# Patient Record
Sex: FEMALE | Race: WHITE | Employment: OTHER | URBAN - METROPOLITAN AREA
[De-identification: names, ages, dates, MRNs, and addresses within clinical notes are randomized per-mention and may not be internally consistent; named-entity substitution may affect disease eponyms.]

---

## 2017-01-01 ENCOUNTER — APPOINTMENT (OUTPATIENT)
Dept: CT IMAGING | Facility: CLINIC | Age: 72
End: 2017-01-01
Attending: EMERGENCY MEDICINE
Payer: MEDICARE

## 2017-01-01 ENCOUNTER — TELEPHONE (OUTPATIENT)
Dept: PEDIATRICS | Facility: CLINIC | Age: 72
End: 2017-01-01

## 2017-01-01 ENCOUNTER — APPOINTMENT (OUTPATIENT)
Dept: GENERAL RADIOLOGY | Facility: CLINIC | Age: 72
End: 2017-01-01
Attending: EMERGENCY MEDICINE
Payer: MEDICARE

## 2017-01-01 ENCOUNTER — HOSPITAL ENCOUNTER (OUTPATIENT)
Dept: CARDIOLOGY | Facility: CLINIC | Age: 72
Discharge: HOME | End: 2017-01-31
Attending: INTERNAL MEDICINE | Admitting: INTERNAL MEDICINE
Payer: MEDICARE

## 2017-01-01 ENCOUNTER — APPOINTMENT (OUTPATIENT)
Dept: CT IMAGING | Facility: CLINIC | Age: 72
End: 2017-01-01
Attending: INTERNAL MEDICINE
Payer: MEDICARE

## 2017-01-01 ENCOUNTER — HOSPITAL ENCOUNTER (EMERGENCY)
Facility: CLINIC | Age: 72
Discharge: HOME | End: 2017-03-26
Attending: INTERNAL MEDICINE | Admitting: INTERNAL MEDICINE
Payer: MEDICARE

## 2017-01-01 ENCOUNTER — TELEPHONE (OUTPATIENT)
Dept: OBGYN | Facility: CLINIC | Age: 72
End: 2017-01-01

## 2017-01-01 ENCOUNTER — OFFICE VISIT (OUTPATIENT)
Dept: OBGYN | Facility: CLINIC | Age: 72
End: 2017-01-01
Payer: COMMERCIAL

## 2017-01-01 ENCOUNTER — OFFICE VISIT (OUTPATIENT)
Dept: PEDIATRICS | Facility: CLINIC | Age: 72
End: 2017-01-01
Payer: COMMERCIAL

## 2017-01-01 ENCOUNTER — RADIANT APPOINTMENT (OUTPATIENT)
Dept: ULTRASOUND IMAGING | Facility: CLINIC | Age: 72
End: 2017-01-01
Attending: INTERNAL MEDICINE
Payer: COMMERCIAL

## 2017-01-01 ENCOUNTER — TRANSFERRED RECORDS (OUTPATIENT)
Dept: HEALTH INFORMATION MANAGEMENT | Facility: CLINIC | Age: 72
End: 2017-01-01

## 2017-01-01 ENCOUNTER — HOSPITAL ENCOUNTER (OUTPATIENT)
Dept: LAB | Facility: CLINIC | Age: 72
Discharge: HOME | End: 2017-03-30
Attending: NURSE PRACTITIONER | Admitting: NURSE PRACTITIONER
Payer: MEDICARE

## 2017-01-01 ENCOUNTER — HOSPITAL ENCOUNTER (OUTPATIENT)
Facility: CLINIC | Age: 72
Setting detail: OBSERVATION
Discharge: HOME | End: 2017-04-01
Attending: EMERGENCY MEDICINE | Admitting: INTERNAL MEDICINE
Payer: MEDICARE

## 2017-01-01 ENCOUNTER — OFFICE VISIT (OUTPATIENT)
Dept: NEUROLOGY | Facility: CLINIC | Age: 72
End: 2017-01-01

## 2017-01-01 ENCOUNTER — PRE VISIT (OUTPATIENT)
Dept: NEUROLOGY | Facility: CLINIC | Age: 72
End: 2017-01-01

## 2017-01-01 ENCOUNTER — HOSPITAL ENCOUNTER (EMERGENCY)
Facility: CLINIC | Age: 72
Discharge: HOME | End: 2017-08-06
Attending: EMERGENCY MEDICINE | Admitting: EMERGENCY MEDICINE
Payer: MEDICARE

## 2017-01-01 ENCOUNTER — APPOINTMENT (OUTPATIENT)
Dept: CT IMAGING | Facility: CLINIC | Age: 72
End: 2017-01-01
Attending: NURSE PRACTITIONER
Payer: MEDICARE

## 2017-01-01 ENCOUNTER — OFFICE VISIT (OUTPATIENT)
Dept: URGENT CARE | Facility: URGENT CARE | Age: 72
End: 2017-01-01
Payer: COMMERCIAL

## 2017-01-01 ENCOUNTER — HOSPITAL ENCOUNTER (OUTPATIENT)
Dept: CT IMAGING | Facility: CLINIC | Age: 72
Discharge: HOME | End: 2017-05-17
Attending: INTERNAL MEDICINE | Admitting: INTERNAL MEDICINE
Payer: MEDICARE

## 2017-01-01 VITALS
RESPIRATION RATE: 18 BRPM | DIASTOLIC BLOOD PRESSURE: 44 MMHG | OXYGEN SATURATION: 96 % | SYSTOLIC BLOOD PRESSURE: 124 MMHG | TEMPERATURE: 98.4 F

## 2017-01-01 VITALS
TEMPERATURE: 99.1 F | HEART RATE: 76 BPM | OXYGEN SATURATION: 97 % | SYSTOLIC BLOOD PRESSURE: 132 MMHG | DIASTOLIC BLOOD PRESSURE: 64 MMHG | HEIGHT: 66 IN | WEIGHT: 204.44 LBS | BODY MASS INDEX: 32.86 KG/M2

## 2017-01-01 VITALS
SYSTOLIC BLOOD PRESSURE: 140 MMHG | DIASTOLIC BLOOD PRESSURE: 72 MMHG | BODY MASS INDEX: 32.44 KG/M2 | WEIGHT: 201 LBS | HEART RATE: 84 BPM

## 2017-01-01 VITALS
TEMPERATURE: 99.7 F | SYSTOLIC BLOOD PRESSURE: 140 MMHG | HEART RATE: 84 BPM | HEIGHT: 66 IN | WEIGHT: 201 LBS | BODY MASS INDEX: 32.3 KG/M2 | DIASTOLIC BLOOD PRESSURE: 72 MMHG

## 2017-01-01 VITALS
OXYGEN SATURATION: 97 % | HEIGHT: 66 IN | TEMPERATURE: 98.9 F | DIASTOLIC BLOOD PRESSURE: 64 MMHG | SYSTOLIC BLOOD PRESSURE: 132 MMHG | HEART RATE: 80 BPM | BODY MASS INDEX: 33.65 KG/M2 | WEIGHT: 209.4 LBS

## 2017-01-01 VITALS
HEART RATE: 89 BPM | TEMPERATURE: 98.6 F | HEIGHT: 66 IN | SYSTOLIC BLOOD PRESSURE: 138 MMHG | BODY MASS INDEX: 35.2 KG/M2 | OXYGEN SATURATION: 97 % | DIASTOLIC BLOOD PRESSURE: 72 MMHG | WEIGHT: 219 LBS

## 2017-01-01 VITALS
HEART RATE: 86 BPM | RESPIRATION RATE: 18 BRPM | SYSTOLIC BLOOD PRESSURE: 128 MMHG | BODY MASS INDEX: 33.75 KG/M2 | OXYGEN SATURATION: 93 % | WEIGHT: 210 LBS | DIASTOLIC BLOOD PRESSURE: 71 MMHG | TEMPERATURE: 96.9 F | HEIGHT: 66 IN

## 2017-01-01 VITALS
HEART RATE: 82 BPM | SYSTOLIC BLOOD PRESSURE: 135 MMHG | TEMPERATURE: 98.6 F | RESPIRATION RATE: 24 BRPM | DIASTOLIC BLOOD PRESSURE: 71 MMHG | OXYGEN SATURATION: 91 %

## 2017-01-01 VITALS
SYSTOLIC BLOOD PRESSURE: 130 MMHG | HEIGHT: 66 IN | DIASTOLIC BLOOD PRESSURE: 68 MMHG | WEIGHT: 202 LBS | TEMPERATURE: 98.4 F | BODY MASS INDEX: 32.47 KG/M2

## 2017-01-01 VITALS
HEIGHT: 66 IN | BODY MASS INDEX: 34.07 KG/M2 | OXYGEN SATURATION: 96 % | SYSTOLIC BLOOD PRESSURE: 154 MMHG | WEIGHT: 212 LBS | DIASTOLIC BLOOD PRESSURE: 61 MMHG | RESPIRATION RATE: 24 BRPM | HEART RATE: 78 BPM

## 2017-01-01 VITALS
OXYGEN SATURATION: 97 % | SYSTOLIC BLOOD PRESSURE: 150 MMHG | DIASTOLIC BLOOD PRESSURE: 82 MMHG | HEIGHT: 65 IN | HEART RATE: 90 BPM | TEMPERATURE: 97.7 F | BODY MASS INDEX: 35.89 KG/M2 | WEIGHT: 215.44 LBS

## 2017-01-01 VITALS
BODY MASS INDEX: 34.04 KG/M2 | HEART RATE: 80 BPM | WEIGHT: 210.9 LBS | SYSTOLIC BLOOD PRESSURE: 128 MMHG | OXYGEN SATURATION: 95 % | DIASTOLIC BLOOD PRESSURE: 66 MMHG | TEMPERATURE: 98.8 F

## 2017-01-01 DIAGNOSIS — R63.4 WEIGHT LOSS: ICD-10-CM

## 2017-01-01 DIAGNOSIS — I49.9 IRREGULAR HEARTBEAT: Primary | ICD-10-CM

## 2017-01-01 DIAGNOSIS — R30.0 DYSURIA: Primary | ICD-10-CM

## 2017-01-01 DIAGNOSIS — M79.7 FIBROMYALGIA: ICD-10-CM

## 2017-01-01 DIAGNOSIS — G25.81 RESTLESS LEGS SYNDROME (RLS): Primary | ICD-10-CM

## 2017-01-01 DIAGNOSIS — R10.84 ABDOMINAL PAIN, GENERALIZED: Primary | ICD-10-CM

## 2017-01-01 DIAGNOSIS — R10.84 ABDOMINAL PAIN, GENERALIZED: ICD-10-CM

## 2017-01-01 DIAGNOSIS — M62.81 GENERALIZED MUSCLE WEAKNESS: ICD-10-CM

## 2017-01-01 DIAGNOSIS — R11.2 NAUSEA AND VOMITING, INTRACTABILITY OF VOMITING NOT SPECIFIED, UNSPECIFIED VOMITING TYPE: Primary | ICD-10-CM

## 2017-01-01 DIAGNOSIS — R19.7 DIARRHEA, UNSPECIFIED TYPE: ICD-10-CM

## 2017-01-01 DIAGNOSIS — R93.5 ABNORMAL CT SCAN, PELVIS: ICD-10-CM

## 2017-01-01 DIAGNOSIS — F41.9 ANXIETY: ICD-10-CM

## 2017-01-01 DIAGNOSIS — G25.81 RESTLESS LEG SYNDROME: Primary | ICD-10-CM

## 2017-01-01 DIAGNOSIS — N81.0 URETHROCELE: ICD-10-CM

## 2017-01-01 DIAGNOSIS — R11.0 NAUSEA: ICD-10-CM

## 2017-01-01 DIAGNOSIS — Z71.3 DIETARY COUNSELING AND SURVEILLANCE: ICD-10-CM

## 2017-01-01 DIAGNOSIS — I10 BENIGN ESSENTIAL HYPERTENSION: ICD-10-CM

## 2017-01-01 DIAGNOSIS — R10.9 ABDOMINAL PAIN, UNSPECIFIED LOCATION: ICD-10-CM

## 2017-01-01 DIAGNOSIS — N95.0 POSTMENOPAUSAL BLEEDING: Primary | ICD-10-CM

## 2017-01-01 DIAGNOSIS — M79.10 MYALGIA: ICD-10-CM

## 2017-01-01 DIAGNOSIS — E11.9 TYPE 2 DIABETES MELLITUS WITHOUT COMPLICATION, WITHOUT LONG-TERM CURRENT USE OF INSULIN (H): ICD-10-CM

## 2017-01-01 DIAGNOSIS — N81.10 BLADDER PROLAPSE, FEMALE, ACQUIRED: ICD-10-CM

## 2017-01-01 DIAGNOSIS — G25.81 RESTLESS LEG SYNDROME: ICD-10-CM

## 2017-01-01 DIAGNOSIS — I49.9 IRREGULAR HEARTBEAT: ICD-10-CM

## 2017-01-01 DIAGNOSIS — N30.00 ACUTE CYSTITIS WITHOUT HEMATURIA: ICD-10-CM

## 2017-01-01 DIAGNOSIS — G25.81 RESTLESS LEGS SYNDROME (RLS): ICD-10-CM

## 2017-01-01 DIAGNOSIS — G47.33 OSA (OBSTRUCTIVE SLEEP APNEA): Primary | ICD-10-CM

## 2017-01-01 DIAGNOSIS — R93.89 THICKENED ENDOMETRIUM: Primary | ICD-10-CM

## 2017-01-01 DIAGNOSIS — R00.2 PALPITATIONS: ICD-10-CM

## 2017-01-01 DIAGNOSIS — Z92.21 STATUS POST CHEMOTHERAPY: ICD-10-CM

## 2017-01-01 LAB
ALBUMIN SERPL-MCNC: 2.8 G/DL (ref 3.4–5)
ALBUMIN SERPL-MCNC: 3.5 G/DL (ref 3.4–5)
ALBUMIN SERPL-MCNC: 3.5 G/DL (ref 3.4–5)
ALBUMIN SERPL-MCNC: 3.6 G/DL (ref 3.4–5)
ALBUMIN SERPL-MCNC: 3.7 G/DL (ref 3.4–5)
ALBUMIN UR-MCNC: NEGATIVE MG/DL
ALP SERPL-CCNC: 103 U/L (ref 40–150)
ALP SERPL-CCNC: 67 U/L (ref 40–150)
ALP SERPL-CCNC: 81 U/L (ref 40–150)
ALP SERPL-CCNC: 90 U/L (ref 40–150)
ALP SERPL-CCNC: 97 U/L (ref 40–150)
ALT SERPL W P-5'-P-CCNC: 15 U/L (ref 0–50)
ALT SERPL W P-5'-P-CCNC: 24 U/L (ref 0–50)
ALT SERPL W P-5'-P-CCNC: 24 U/L (ref 0–50)
ALT SERPL W P-5'-P-CCNC: 32 U/L (ref 0–50)
ALT SERPL W P-5'-P-CCNC: 33 U/L (ref 0–50)
AMYLASE SERPL-CCNC: 41 U/L (ref 30–110)
ANION GAP SERPL CALCULATED.3IONS-SCNC: 5 MMOL/L (ref 3–14)
ANION GAP SERPL CALCULATED.3IONS-SCNC: 6 MMOL/L (ref 3–14)
ANION GAP SERPL CALCULATED.3IONS-SCNC: 6 MMOL/L (ref 3–14)
ANION GAP SERPL CALCULATED.3IONS-SCNC: 7 MMOL/L (ref 3–14)
ANION GAP SERPL CALCULATED.3IONS-SCNC: 8 MMOL/L (ref 3–14)
ANION GAP SERPL CALCULATED.3IONS-SCNC: 9 MMOL/L (ref 3–14)
ANION GAP SERPL CALCULATED.3IONS-SCNC: 9 MMOL/L (ref 3–14)
APPEARANCE UR: ABNORMAL
APPEARANCE UR: ABNORMAL
APPEARANCE UR: CLEAR
APPEARANCE UR: CLEAR
AST SERPL W P-5'-P-CCNC: 16 U/L (ref 0–45)
AST SERPL W P-5'-P-CCNC: 17 U/L (ref 0–45)
AST SERPL W P-5'-P-CCNC: 21 U/L (ref 0–45)
AST SERPL W P-5'-P-CCNC: 22 U/L (ref 0–45)
AST SERPL W P-5'-P-CCNC: 28 U/L (ref 0–45)
BACTERIA #/AREA URNS HPF: ABNORMAL /HPF
BASOPHILS # BLD AUTO: 0 10E9/L (ref 0–0.2)
BASOPHILS NFR BLD AUTO: 0.3 %
BASOPHILS NFR BLD AUTO: 0.4 %
BASOPHILS NFR BLD AUTO: 0.4 %
BILIRUB DIRECT SERPL-MCNC: 0.2 MG/DL (ref 0–0.2)
BILIRUB SERPL-MCNC: 0.4 MG/DL (ref 0.2–1.3)
BILIRUB SERPL-MCNC: 0.5 MG/DL (ref 0.2–1.3)
BILIRUB SERPL-MCNC: 0.6 MG/DL (ref 0.2–1.3)
BILIRUB SERPL-MCNC: 0.7 MG/DL (ref 0.2–1.3)
BILIRUB SERPL-MCNC: 1 MG/DL (ref 0.2–1.3)
BILIRUB UR QL STRIP: NEGATIVE
BUN SERPL-MCNC: 10 MG/DL (ref 7–30)
BUN SERPL-MCNC: 11 MG/DL (ref 7–30)
BUN SERPL-MCNC: 12 MG/DL (ref 7–30)
BUN SERPL-MCNC: 15 MG/DL (ref 7–30)
BUN SERPL-MCNC: 17 MG/DL (ref 7–30)
BUN SERPL-MCNC: 18 MG/DL (ref 7–30)
BUN SERPL-MCNC: 21 MG/DL (ref 7–30)
CALCIUM SERPL-MCNC: 7.9 MG/DL (ref 8.5–10.1)
CALCIUM SERPL-MCNC: 8.1 MG/DL (ref 8.5–10.1)
CALCIUM SERPL-MCNC: 8.5 MG/DL (ref 8.5–10.1)
CALCIUM SERPL-MCNC: 8.8 MG/DL (ref 8.5–10.1)
CALCIUM SERPL-MCNC: 8.8 MG/DL (ref 8.5–10.1)
CALCIUM SERPL-MCNC: 9.2 MG/DL (ref 8.5–10.1)
CALCIUM SERPL-MCNC: 9.4 MG/DL (ref 8.5–10.1)
CHLORIDE SERPL-SCNC: 101 MMOL/L (ref 94–109)
CHLORIDE SERPL-SCNC: 103 MMOL/L (ref 94–109)
CHLORIDE SERPL-SCNC: 105 MMOL/L (ref 94–109)
CHLORIDE SERPL-SCNC: 106 MMOL/L (ref 94–109)
CHLORIDE SERPL-SCNC: 107 MMOL/L (ref 94–109)
CHLORIDE SERPL-SCNC: 107 MMOL/L (ref 94–109)
CHLORIDE SERPL-SCNC: 110 MMOL/L (ref 94–109)
CK SERPL-CCNC: 36 U/L (ref 30–225)
CO2 SERPL-SCNC: 25 MMOL/L (ref 20–32)
CO2 SERPL-SCNC: 25 MMOL/L (ref 20–32)
CO2 SERPL-SCNC: 26 MMOL/L (ref 20–32)
CO2 SERPL-SCNC: 27 MMOL/L (ref 20–32)
CO2 SERPL-SCNC: 28 MMOL/L (ref 20–32)
CO2 SERPL-SCNC: 29 MMOL/L (ref 20–32)
CO2 SERPL-SCNC: 29 MMOL/L (ref 20–32)
COLOR UR AUTO: YELLOW
COPATH REPORT: NORMAL
CREAT SERPL-MCNC: 0.64 MG/DL (ref 0.52–1.04)
CREAT SERPL-MCNC: 0.66 MG/DL (ref 0.52–1.04)
CREAT SERPL-MCNC: 0.73 MG/DL (ref 0.52–1.04)
CREAT SERPL-MCNC: 0.73 MG/DL (ref 0.52–1.04)
CREAT SERPL-MCNC: 0.77 MG/DL (ref 0.52–1.04)
CREAT SERPL-MCNC: 0.8 MG/DL (ref 0.52–1.04)
CREAT SERPL-MCNC: 0.87 MG/DL (ref 0.52–1.04)
CRP SERPL-MCNC: 120 MG/L (ref 0–8)
CRP SERPL-MCNC: 73.5 MG/L (ref 0–8)
CRP SERPL-MCNC: 77 MG/L (ref 0–8)
D DIMER PPP FEU-MCNC: 6 UG/ML FEU (ref 0–0.5)
DIFFERENTIAL METHOD BLD: ABNORMAL
DIFFERENTIAL METHOD BLD: NORMAL
EOSINOPHIL # BLD AUTO: 0 10E9/L (ref 0–0.7)
EOSINOPHIL # BLD AUTO: 0.1 10E9/L (ref 0–0.7)
EOSINOPHIL # BLD AUTO: 0.2 10E9/L (ref 0–0.7)
EOSINOPHIL NFR BLD AUTO: 0.3 %
EOSINOPHIL NFR BLD AUTO: 1.1 %
EOSINOPHIL NFR BLD AUTO: 1.9 %
EOSINOPHIL NFR BLD AUTO: 2.1 %
EOSINOPHIL NFR BLD AUTO: 2.3 %
ERYTHROCYTE [DISTWIDTH] IN BLOOD BY AUTOMATED COUNT: 13.2 % (ref 10–15)
ERYTHROCYTE [DISTWIDTH] IN BLOOD BY AUTOMATED COUNT: 13.2 % (ref 10–15)
ERYTHROCYTE [DISTWIDTH] IN BLOOD BY AUTOMATED COUNT: 13.3 % (ref 10–15)
ERYTHROCYTE [DISTWIDTH] IN BLOOD BY AUTOMATED COUNT: 13.4 % (ref 10–15)
ERYTHROCYTE [DISTWIDTH] IN BLOOD BY AUTOMATED COUNT: 14.5 % (ref 10–15)
ERYTHROCYTE [DISTWIDTH] IN BLOOD BY AUTOMATED COUNT: 24.7 % (ref 10–15)
ERYTHROCYTE [SEDIMENTATION RATE] IN BLOOD BY WESTERGREN METHOD: 52 MM/H (ref 0–30)
ERYTHROCYTE [SEDIMENTATION RATE] IN BLOOD BY WESTERGREN METHOD: 72 MM/H (ref 0–30)
ERYTHROCYTE [SEDIMENTATION RATE] IN BLOOD BY WESTERGREN METHOD: 77 MM/H (ref 0–30)
GFR SERPL CREATININE-BSD FRML MDRD: 64 ML/MIN/1.7M2
GFR SERPL CREATININE-BSD FRML MDRD: 70 ML/MIN/1.7M2
GFR SERPL CREATININE-BSD FRML MDRD: 74 ML/MIN/1.7M2
GFR SERPL CREATININE-BSD FRML MDRD: 78 ML/MIN/1.7M2
GFR SERPL CREATININE-BSD FRML MDRD: 78 ML/MIN/1.7M2
GFR SERPL CREATININE-BSD FRML MDRD: 88 ML/MIN/1.7M2
GFR SERPL CREATININE-BSD FRML MDRD: ABNORMAL ML/MIN/1.7M2
GLUCOSE SERPL-MCNC: 100 MG/DL (ref 70–99)
GLUCOSE SERPL-MCNC: 105 MG/DL (ref 70–99)
GLUCOSE SERPL-MCNC: 109 MG/DL (ref 70–99)
GLUCOSE SERPL-MCNC: 112 MG/DL (ref 70–99)
GLUCOSE SERPL-MCNC: 120 MG/DL (ref 70–99)
GLUCOSE SERPL-MCNC: 97 MG/DL (ref 70–99)
GLUCOSE SERPL-MCNC: 98 MG/DL (ref 70–99)
GLUCOSE UR STRIP-MCNC: NEGATIVE MG/DL
HBA1C MFR BLD: 5.9 % (ref 4.3–6)
HCT VFR BLD AUTO: 27.4 % (ref 35–47)
HCT VFR BLD AUTO: 29 % (ref 35–47)
HCT VFR BLD AUTO: 33.4 % (ref 35–47)
HCT VFR BLD AUTO: 33.8 % (ref 35–47)
HCT VFR BLD AUTO: 34.4 % (ref 35–47)
HCT VFR BLD AUTO: 35.4 % (ref 35–47)
HGB BLD-MCNC: 10.4 G/DL (ref 11.7–15.7)
HGB BLD-MCNC: 11 G/DL (ref 11.7–15.7)
HGB BLD-MCNC: 11.1 G/DL (ref 11.7–15.7)
HGB BLD-MCNC: 11.7 G/DL (ref 11.7–15.7)
HGB BLD-MCNC: 9 G/DL (ref 11.7–15.7)
HGB BLD-MCNC: 9.4 G/DL (ref 11.7–15.7)
HGB UR QL STRIP: NEGATIVE
IMM GRANULOCYTES # BLD: 0 10E9/L (ref 0–0.4)
IMM GRANULOCYTES NFR BLD: 0.3 %
IMM GRANULOCYTES NFR BLD: 0.5 %
IMM GRANULOCYTES NFR BLD: 0.6 %
IMM GRANULOCYTES NFR BLD: 0.6 %
KETONES UR STRIP-MCNC: 5 MG/DL
KETONES UR STRIP-MCNC: 5 MG/DL
KETONES UR STRIP-MCNC: NEGATIVE MG/DL
KETONES UR STRIP-MCNC: NEGATIVE MG/DL
LACTATE BLD-SCNC: 0.6 MMOL/L (ref 0.7–2.1)
LACTATE SERPL-SCNC: 0.8 MMOL/L (ref 0.4–2)
LACTATE SERPL-SCNC: 0.9 MMOL/L (ref 0.4–2)
LEUKOCYTE ESTERASE UR QL STRIP: NEGATIVE
LIPASE SERPL-CCNC: 106 U/L (ref 73–393)
LIPASE SERPL-CCNC: 76 U/L (ref 73–393)
LIPASE SERPL-CCNC: 90 U/L (ref 73–393)
LYMPHOCYTES # BLD AUTO: 0.5 10E9/L (ref 0.8–5.3)
LYMPHOCYTES # BLD AUTO: 1.1 10E9/L (ref 0.8–5.3)
LYMPHOCYTES # BLD AUTO: 1.2 10E9/L (ref 0.8–5.3)
LYMPHOCYTES # BLD AUTO: 1.6 10E9/L (ref 0.8–5.3)
LYMPHOCYTES # BLD AUTO: 1.6 10E9/L (ref 0.8–5.3)
LYMPHOCYTES NFR BLD AUTO: 14.1 %
LYMPHOCYTES NFR BLD AUTO: 14.4 %
LYMPHOCYTES NFR BLD AUTO: 15.3 %
LYMPHOCYTES NFR BLD AUTO: 23.4 %
LYMPHOCYTES NFR BLD AUTO: 23.8 %
MCH RBC QN AUTO: 27.9 PG (ref 26.5–33)
MCH RBC QN AUTO: 29.6 PG (ref 26.5–33)
MCH RBC QN AUTO: 29.8 PG (ref 26.5–33)
MCH RBC QN AUTO: 30.2 PG (ref 26.5–33)
MCH RBC QN AUTO: 30.5 PG (ref 26.5–33)
MCH RBC QN AUTO: 30.5 PG (ref 26.5–33)
MCHC RBC AUTO-ENTMCNC: 31.1 G/DL (ref 31.5–36.5)
MCHC RBC AUTO-ENTMCNC: 32 G/DL (ref 31.5–36.5)
MCHC RBC AUTO-ENTMCNC: 32.4 G/DL (ref 31.5–36.5)
MCHC RBC AUTO-ENTMCNC: 32.8 G/DL (ref 31.5–36.5)
MCHC RBC AUTO-ENTMCNC: 32.8 G/DL (ref 31.5–36.5)
MCHC RBC AUTO-ENTMCNC: 33.1 G/DL (ref 31.5–36.5)
MCV RBC AUTO: 90 FL (ref 78–100)
MCV RBC AUTO: 90 FL (ref 78–100)
MCV RBC AUTO: 92 FL (ref 78–100)
MCV RBC AUTO: 93 FL (ref 78–100)
MONOCYTES # BLD AUTO: 0.2 10E9/L (ref 0–1.3)
MONOCYTES # BLD AUTO: 0.6 10E9/L (ref 0–1.3)
MONOCYTES # BLD AUTO: 0.6 10E9/L (ref 0–1.3)
MONOCYTES # BLD AUTO: 0.7 10E9/L (ref 0–1.3)
MONOCYTES # BLD AUTO: 0.7 10E9/L (ref 0–1.3)
MONOCYTES NFR BLD AUTO: 5.5 %
MONOCYTES NFR BLD AUTO: 8.2 %
MONOCYTES NFR BLD AUTO: 9.1 %
MONOCYTES NFR BLD AUTO: 9.2 %
MONOCYTES NFR BLD AUTO: 9.4 %
MUCOUS THREADS #/AREA URNS LPF: PRESENT /LPF
MUCOUS THREADS #/AREA URNS LPF: PRESENT /LPF
NEUTROPHILS # BLD AUTO: 2.6 10E9/L (ref 1.6–8.3)
NEUTROPHILS # BLD AUTO: 4.3 10E9/L (ref 1.6–8.3)
NEUTROPHILS # BLD AUTO: 4.4 10E9/L (ref 1.6–8.3)
NEUTROPHILS # BLD AUTO: 5.5 10E9/L (ref 1.6–8.3)
NEUTROPHILS # BLD AUTO: 6.2 10E9/L (ref 1.6–8.3)
NEUTROPHILS NFR BLD AUTO: 64 %
NEUTROPHILS NFR BLD AUTO: 64.5 %
NEUTROPHILS NFR BLD AUTO: 74.3 %
NEUTROPHILS NFR BLD AUTO: 75.4 %
NEUTROPHILS NFR BLD AUTO: 78 %
NITRATE UR QL: NEGATIVE
NRBC # BLD AUTO: 0 10*3/UL
NRBC BLD AUTO-RTO: 0 /100
PH UR STRIP: 5 PH (ref 5–7)
PH UR STRIP: 5 PH (ref 5–7)
PH UR STRIP: 6 PH (ref 5–7)
PH UR STRIP: 7 PH (ref 5–7)
PLATELET # BLD AUTO: 118 10E9/L (ref 150–450)
PLATELET # BLD AUTO: 162 10E9/L (ref 150–450)
PLATELET # BLD AUTO: 189 10E9/L (ref 150–450)
PLATELET # BLD AUTO: 200 10E9/L (ref 150–450)
PLATELET # BLD AUTO: 210 10E9/L (ref 150–450)
PLATELET # BLD AUTO: 279 10E9/L (ref 150–450)
POTASSIUM SERPL-SCNC: 3.6 MMOL/L (ref 3.4–5.3)
POTASSIUM SERPL-SCNC: 3.6 MMOL/L (ref 3.4–5.3)
POTASSIUM SERPL-SCNC: 3.7 MMOL/L (ref 3.4–5.3)
POTASSIUM SERPL-SCNC: 3.7 MMOL/L (ref 3.4–5.3)
POTASSIUM SERPL-SCNC: 3.9 MMOL/L (ref 3.4–5.3)
POTASSIUM SERPL-SCNC: 4 MMOL/L (ref 3.4–5.3)
POTASSIUM SERPL-SCNC: 4.1 MMOL/L (ref 3.4–5.3)
PROT SERPL-MCNC: 6.2 G/DL (ref 6.8–8.8)
PROT SERPL-MCNC: 7.2 G/DL (ref 6.8–8.8)
PROT SERPL-MCNC: 7.3 G/DL (ref 6.8–8.8)
PROT SERPL-MCNC: 7.4 G/DL (ref 6.8–8.8)
PROT SERPL-MCNC: 7.4 G/DL (ref 6.8–8.8)
RADIOLOGIST FLAGS: ABNORMAL
RBC # BLD AUTO: 3.04 10E12/L (ref 3.8–5.2)
RBC # BLD AUTO: 3.11 10E12/L (ref 3.8–5.2)
RBC # BLD AUTO: 3.64 10E12/L (ref 3.8–5.2)
RBC # BLD AUTO: 3.69 10E12/L (ref 3.8–5.2)
RBC # BLD AUTO: 3.73 10E12/L (ref 3.8–5.2)
RBC # BLD AUTO: 3.84 10E12/L (ref 3.8–5.2)
RBC #/AREA URNS AUTO: 1 /HPF (ref 0–2)
RBC #/AREA URNS AUTO: 2 /HPF (ref 0–2)
RBC #/AREA URNS AUTO: <1 /HPF (ref 0–2)
SODIUM SERPL-SCNC: 136 MMOL/L (ref 133–144)
SODIUM SERPL-SCNC: 137 MMOL/L (ref 133–144)
SODIUM SERPL-SCNC: 140 MMOL/L (ref 133–144)
SODIUM SERPL-SCNC: 140 MMOL/L (ref 133–144)
SODIUM SERPL-SCNC: 141 MMOL/L (ref 133–144)
SODIUM SERPL-SCNC: 142 MMOL/L (ref 133–144)
SODIUM SERPL-SCNC: 142 MMOL/L (ref 133–144)
SP GR UR STRIP: 1 (ref 1–1.03)
SP GR UR STRIP: 1.01 (ref 1–1.03)
SP GR UR STRIP: 1.02 (ref 1–1.03)
SP GR UR STRIP: 1.02 (ref 1–1.03)
SQUAMOUS #/AREA URNS AUTO: 1 /HPF (ref 0–1)
SQUAMOUS #/AREA URNS AUTO: 3 /HPF (ref 0–1)
SQUAMOUS #/AREA URNS AUTO: <1 /HPF (ref 0–1)
TROPONIN I SERPL-MCNC: NORMAL UG/L (ref 0–0.04)
URN SPEC COLLECT METH UR: ABNORMAL
URN SPEC COLLECT METH UR: NORMAL
UROBILINOGEN UR STRIP-ACNC: 0.2 EU/DL (ref 0.2–1)
UROBILINOGEN UR STRIP-MCNC: 0 MG/DL (ref 0–2)
UROBILINOGEN UR STRIP-MCNC: 0 MG/DL (ref 0–2)
UROBILINOGEN UR STRIP-MCNC: 2 MG/DL (ref 0–2)
WBC # BLD AUTO: 3.3 10E9/L (ref 4–11)
WBC # BLD AUTO: 6.4 10E9/L (ref 4–11)
WBC # BLD AUTO: 6.7 10E9/L (ref 4–11)
WBC # BLD AUTO: 6.9 10E9/L (ref 4–11)
WBC # BLD AUTO: 7.4 10E9/L (ref 4–11)
WBC # BLD AUTO: 8.2 10E9/L (ref 4–11)
WBC #/AREA URNS AUTO: 2 /HPF (ref 0–2)
WBC #/AREA URNS AUTO: 2 /HPF (ref 0–2)
WBC #/AREA URNS AUTO: <1 /HPF (ref 0–2)

## 2017-01-01 PROCEDURE — 74177 CT ABD & PELVIS W/CONTRAST: CPT

## 2017-01-01 PROCEDURE — 83690 ASSAY OF LIPASE: CPT | Performed by: INTERNAL MEDICINE

## 2017-01-01 PROCEDURE — 85652 RBC SED RATE AUTOMATED: CPT | Performed by: INTERNAL MEDICINE

## 2017-01-01 PROCEDURE — 93000 ELECTROCARDIOGRAM COMPLETE: CPT | Performed by: INTERNAL MEDICINE

## 2017-01-01 PROCEDURE — 25000128 H RX IP 250 OP 636: Performed by: EMERGENCY MEDICINE

## 2017-01-01 PROCEDURE — 76856 US EXAM PELVIC COMPLETE: CPT | Performed by: FAMILY MEDICINE

## 2017-01-01 PROCEDURE — 99214 OFFICE O/P EST MOD 30 MIN: CPT | Performed by: NURSE PRACTITIONER

## 2017-01-01 PROCEDURE — 80053 COMPREHEN METABOLIC PANEL: CPT | Performed by: INTERNAL MEDICINE

## 2017-01-01 PROCEDURE — 83690 ASSAY OF LIPASE: CPT | Performed by: EMERGENCY MEDICINE

## 2017-01-01 PROCEDURE — 99214 OFFICE O/P EST MOD 30 MIN: CPT | Performed by: INTERNAL MEDICINE

## 2017-01-01 PROCEDURE — 80048 BASIC METABOLIC PNL TOTAL CA: CPT | Performed by: PHYSICIAN ASSISTANT

## 2017-01-01 PROCEDURE — 93227 XTRNL ECG REC<48 HR R&I: CPT | Performed by: INTERNAL MEDICINE

## 2017-01-01 PROCEDURE — 51702 INSERT TEMP BLADDER CATH: CPT

## 2017-01-01 PROCEDURE — 85025 COMPLETE CBC W/AUTO DIFF WBC: CPT | Performed by: EMERGENCY MEDICINE

## 2017-01-01 PROCEDURE — 93226 XTRNL ECG REC<48 HR SCAN A/R: CPT

## 2017-01-01 PROCEDURE — A9270 NON-COVERED ITEM OR SERVICE: HCPCS | Mod: GY | Performed by: PHYSICIAN ASSISTANT

## 2017-01-01 PROCEDURE — 88360 TUMOR IMMUNOHISTOCHEM/MANUAL: CPT | Performed by: OBSTETRICS & GYNECOLOGY

## 2017-01-01 PROCEDURE — 25000132 ZZH RX MED GY IP 250 OP 250 PS 637: Mod: GY | Performed by: PHYSICIAN ASSISTANT

## 2017-01-01 PROCEDURE — G0378 HOSPITAL OBSERVATION PER HR: HCPCS

## 2017-01-01 PROCEDURE — 25000128 H RX IP 250 OP 636: Performed by: INTERNAL MEDICINE

## 2017-01-01 PROCEDURE — 86140 C-REACTIVE PROTEIN: CPT | Performed by: PHYSICIAN ASSISTANT

## 2017-01-01 PROCEDURE — 25000132 ZZH RX MED GY IP 250 OP 250 PS 637: Mod: GY | Performed by: EMERGENCY MEDICINE

## 2017-01-01 PROCEDURE — 80053 COMPREHEN METABOLIC PANEL: CPT | Performed by: EMERGENCY MEDICINE

## 2017-01-01 PROCEDURE — 99285 EMERGENCY DEPT VISIT HI MDM: CPT | Mod: 25

## 2017-01-01 PROCEDURE — 81001 URINALYSIS AUTO W/SCOPE: CPT | Performed by: NURSE PRACTITIONER

## 2017-01-01 PROCEDURE — 80076 HEPATIC FUNCTION PANEL: CPT | Performed by: EMERGENCY MEDICINE

## 2017-01-01 PROCEDURE — 88342 IMHCHEM/IMCYTCHM 1ST ANTB: CPT | Mod: 59 | Performed by: OBSTETRICS & GYNECOLOGY

## 2017-01-01 PROCEDURE — 96374 THER/PROPH/DIAG INJ IV PUSH: CPT | Mod: 59

## 2017-01-01 PROCEDURE — 25000128 H RX IP 250 OP 636: Performed by: PHYSICIAN ASSISTANT

## 2017-01-01 PROCEDURE — 99217 ZZC OBSERVATION CARE DISCHARGE: CPT | Performed by: PHYSICIAN ASSISTANT

## 2017-01-01 PROCEDURE — 25500064 ZZH RX 255 OP 636: Performed by: INTERNAL MEDICINE

## 2017-01-01 PROCEDURE — 83605 ASSAY OF LACTIC ACID: CPT | Performed by: NURSE PRACTITIONER

## 2017-01-01 PROCEDURE — 25500064 ZZH RX 255 OP 636: Performed by: EMERGENCY MEDICINE

## 2017-01-01 PROCEDURE — 83605 ASSAY OF LACTIC ACID: CPT | Performed by: EMERGENCY MEDICINE

## 2017-01-01 PROCEDURE — 74174 CTA ABD&PLVS W/CONTRAST: CPT

## 2017-01-01 PROCEDURE — 96376 TX/PRO/DX INJ SAME DRUG ADON: CPT | Mod: 59

## 2017-01-01 PROCEDURE — 76830 TRANSVAGINAL US NON-OB: CPT | Mod: 59 | Performed by: FAMILY MEDICINE

## 2017-01-01 PROCEDURE — 88305 TISSUE EXAM BY PATHOLOGIST: CPT | Performed by: OBSTETRICS & GYNECOLOGY

## 2017-01-01 PROCEDURE — 71020 XR CHEST 2 VW: CPT

## 2017-01-01 PROCEDURE — 99220 ZZC INITIAL OBSERVATION CARE,LEVL III: CPT | Performed by: PHYSICIAN ASSISTANT

## 2017-01-01 PROCEDURE — S0028 INJECTION, FAMOTIDINE, 20 MG: HCPCS | Performed by: EMERGENCY MEDICINE

## 2017-01-01 PROCEDURE — 86140 C-REACTIVE PROTEIN: CPT | Performed by: NURSE PRACTITIONER

## 2017-01-01 PROCEDURE — 81003 URINALYSIS AUTO W/O SCOPE: CPT | Performed by: PHYSICIAN ASSISTANT

## 2017-01-01 PROCEDURE — 82550 ASSAY OF CK (CPK): CPT | Performed by: EMERGENCY MEDICINE

## 2017-01-01 PROCEDURE — 58100 BIOPSY OF UTERUS LINING: CPT | Performed by: OBSTETRICS & GYNECOLOGY

## 2017-01-01 PROCEDURE — 85652 RBC SED RATE AUTOMATED: CPT | Performed by: NURSE PRACTITIONER

## 2017-01-01 PROCEDURE — 85652 RBC SED RATE AUTOMATED: CPT | Performed by: PHYSICIAN ASSISTANT

## 2017-01-01 PROCEDURE — 81001 URINALYSIS AUTO W/SCOPE: CPT | Performed by: EMERGENCY MEDICINE

## 2017-01-01 PROCEDURE — 96361 HYDRATE IV INFUSION ADD-ON: CPT

## 2017-01-01 PROCEDURE — 86140 C-REACTIVE PROTEIN: CPT | Performed by: INTERNAL MEDICINE

## 2017-01-01 PROCEDURE — 96375 TX/PRO/DX INJ NEW DRUG ADDON: CPT

## 2017-01-01 PROCEDURE — 36415 COLL VENOUS BLD VENIPUNCTURE: CPT | Performed by: INTERNAL MEDICINE

## 2017-01-01 PROCEDURE — 25000125 ZZHC RX 250: Performed by: EMERGENCY MEDICINE

## 2017-01-01 PROCEDURE — 25000128 H RX IP 250 OP 636: Performed by: NURSE PRACTITIONER

## 2017-01-01 PROCEDURE — 80053 COMPREHEN METABOLIC PANEL: CPT | Performed by: NURSE PRACTITIONER

## 2017-01-01 PROCEDURE — 83036 HEMOGLOBIN GLYCOSYLATED A1C: CPT | Performed by: INTERNAL MEDICINE

## 2017-01-01 PROCEDURE — 36415 COLL VENOUS BLD VENIPUNCTURE: CPT | Performed by: PHYSICIAN ASSISTANT

## 2017-01-01 PROCEDURE — 71260 CT THORAX DX C+: CPT

## 2017-01-01 PROCEDURE — 82150 ASSAY OF AMYLASE: CPT | Performed by: INTERNAL MEDICINE

## 2017-01-01 PROCEDURE — 96360 HYDRATION IV INFUSION INIT: CPT | Mod: 59

## 2017-01-01 PROCEDURE — 25000128 H RX IP 250 OP 636: Performed by: RADIOLOGY

## 2017-01-01 PROCEDURE — 25000132 ZZH RX MED GY IP 250 OP 250 PS 637: Mod: GY | Performed by: NURSE PRACTITIONER

## 2017-01-01 PROCEDURE — 99213 OFFICE O/P EST LOW 20 MIN: CPT | Performed by: OBSTETRICS & GYNECOLOGY

## 2017-01-01 PROCEDURE — 99215 OFFICE O/P EST HI 40 MIN: CPT | Performed by: INTERNAL MEDICINE

## 2017-01-01 PROCEDURE — 80048 BASIC METABOLIC PNL TOTAL CA: CPT | Performed by: EMERGENCY MEDICINE

## 2017-01-01 PROCEDURE — 85025 COMPLETE CBC W/AUTO DIFF WBC: CPT | Performed by: INTERNAL MEDICINE

## 2017-01-01 PROCEDURE — 80048 BASIC METABOLIC PNL TOTAL CA: CPT | Performed by: INTERNAL MEDICINE

## 2017-01-01 PROCEDURE — A9270 NON-COVERED ITEM OR SERVICE: HCPCS | Mod: GY | Performed by: EMERGENCY MEDICINE

## 2017-01-01 PROCEDURE — 99207 ZZC NO BILLABLE SERVICE THIS VISIT: CPT | Performed by: PHYSICIAN ASSISTANT

## 2017-01-01 PROCEDURE — 85025 COMPLETE CBC W/AUTO DIFF WBC: CPT | Performed by: NURSE PRACTITIONER

## 2017-01-01 PROCEDURE — 85379 FIBRIN DEGRADATION QUANT: CPT | Performed by: EMERGENCY MEDICINE

## 2017-01-01 PROCEDURE — 85027 COMPLETE CBC AUTOMATED: CPT | Performed by: PHYSICIAN ASSISTANT

## 2017-01-01 PROCEDURE — 88341 IMHCHEM/IMCYTCHM EA ADD ANTB: CPT | Mod: 59 | Performed by: OBSTETRICS & GYNECOLOGY

## 2017-01-01 PROCEDURE — 84484 ASSAY OF TROPONIN QUANT: CPT | Performed by: EMERGENCY MEDICINE

## 2017-01-01 PROCEDURE — 36415 COLL VENOUS BLD VENIPUNCTURE: CPT | Performed by: NURSE PRACTITIONER

## 2017-01-01 PROCEDURE — 83690 ASSAY OF LIPASE: CPT | Performed by: PHYSICIAN ASSISTANT

## 2017-01-01 RX ORDER — OXYCODONE AND ACETAMINOPHEN 5; 325 MG/1; MG/1
1-2 TABLET ORAL EVERY 4 HOURS PRN
Qty: 15 TABLET | Refills: 0 | Status: ON HOLD | OUTPATIENT
Start: 2017-01-01 | End: 2017-01-01

## 2017-01-01 RX ORDER — OXYCODONE AND ACETAMINOPHEN 5; 325 MG/1; MG/1
1-2 TABLET ORAL EVERY 4 HOURS PRN
Qty: 30 TABLET | Refills: 0 | Status: SHIPPED | OUTPATIENT
Start: 2017-01-01

## 2017-01-01 RX ORDER — CLONAZEPAM 0.5 MG/1
0.25 TABLET ORAL SEE ADMIN INSTRUCTIONS
Qty: 30 TABLET | Refills: 0 | Status: ON HOLD | OUTPATIENT
Start: 2017-01-01 | End: 2017-01-01

## 2017-01-01 RX ORDER — DULOXETIN HYDROCHLORIDE 30 MG/1
30 CAPSULE, DELAYED RELEASE ORAL DAILY
Qty: 30 CAPSULE | Refills: 1 | Status: SHIPPED | OUTPATIENT
Start: 2017-01-01 | End: 2017-01-01

## 2017-01-01 RX ORDER — IOPAMIDOL 755 MG/ML
500 INJECTION, SOLUTION INTRAVASCULAR ONCE
Status: COMPLETED | OUTPATIENT
Start: 2017-01-01 | End: 2017-01-01

## 2017-01-01 RX ORDER — SODIUM CHLORIDE 9 MG/ML
1000 INJECTION, SOLUTION INTRAVENOUS CONTINUOUS
Status: DISCONTINUED | OUTPATIENT
Start: 2017-01-01 | End: 2017-01-01

## 2017-01-01 RX ORDER — ONDANSETRON 2 MG/ML
4 INJECTION INTRAMUSCULAR; INTRAVENOUS EVERY 6 HOURS PRN
Status: DISCONTINUED | OUTPATIENT
Start: 2017-01-01 | End: 2017-01-01 | Stop reason: HOSPADM

## 2017-01-01 RX ORDER — PROCHLORPERAZINE MALEATE 5 MG
5 TABLET ORAL EVERY 6 HOURS PRN
Status: DISCONTINUED | OUTPATIENT
Start: 2017-01-01 | End: 2017-01-01 | Stop reason: HOSPADM

## 2017-01-01 RX ORDER — LIDOCAINE 40 MG/G
CREAM TOPICAL
Status: DISCONTINUED | OUTPATIENT
Start: 2017-01-01 | End: 2017-01-01

## 2017-01-01 RX ORDER — AMLODIPINE BESYLATE 5 MG/1
5 TABLET ORAL EVERY EVENING
Status: DISCONTINUED | OUTPATIENT
Start: 2017-01-01 | End: 2017-01-01 | Stop reason: HOSPADM

## 2017-01-01 RX ORDER — ONDANSETRON 2 MG/ML
4 INJECTION INTRAMUSCULAR; INTRAVENOUS ONCE
Status: COMPLETED | OUTPATIENT
Start: 2017-01-01 | End: 2017-01-01

## 2017-01-01 RX ORDER — ONDANSETRON 4 MG/1
4-8 TABLET, ORALLY DISINTEGRATING ORAL
Qty: 20 TABLET | Refills: 1 | Status: SHIPPED | OUTPATIENT
Start: 2017-01-01

## 2017-01-01 RX ORDER — AMOXICILLIN 250 MG
1-2 CAPSULE ORAL 2 TIMES DAILY PRN
Status: DISCONTINUED | OUTPATIENT
Start: 2017-01-01 | End: 2017-01-01 | Stop reason: HOSPADM

## 2017-01-01 RX ORDER — NALOXONE HYDROCHLORIDE 0.4 MG/ML
.1-.4 INJECTION, SOLUTION INTRAMUSCULAR; INTRAVENOUS; SUBCUTANEOUS
Status: DISCONTINUED | OUTPATIENT
Start: 2017-01-01 | End: 2017-01-01 | Stop reason: HOSPADM

## 2017-01-01 RX ORDER — HYDROMORPHONE HYDROCHLORIDE 1 MG/ML
0.5 INJECTION, SOLUTION INTRAMUSCULAR; INTRAVENOUS; SUBCUTANEOUS
Status: DISCONTINUED | OUTPATIENT
Start: 2017-01-01 | End: 2017-01-01 | Stop reason: HOSPADM

## 2017-01-01 RX ORDER — LOSARTAN POTASSIUM 25 MG/1
100 TABLET ORAL DAILY
Status: DISCONTINUED | OUTPATIENT
Start: 2017-01-01 | End: 2017-01-01 | Stop reason: HOSPADM

## 2017-01-01 RX ORDER — CLONAZEPAM 0.5 MG/1
0.25 TABLET ORAL SEE ADMIN INSTRUCTIONS
Qty: 30 TABLET | Refills: 0 | Status: SHIPPED | OUTPATIENT
Start: 2017-01-01 | End: 2017-01-01

## 2017-01-01 RX ORDER — ACETAMINOPHEN 325 MG/1
650 TABLET ORAL EVERY 4 HOURS PRN
Status: DISCONTINUED | OUTPATIENT
Start: 2017-01-01 | End: 2017-01-01 | Stop reason: HOSPADM

## 2017-01-01 RX ORDER — ROPINIROLE 1 MG/1
1 TABLET, FILM COATED ORAL 3 TIMES DAILY
Status: DISCONTINUED | OUTPATIENT
Start: 2017-01-01 | End: 2017-01-01 | Stop reason: HOSPADM

## 2017-01-01 RX ORDER — OXYCODONE AND ACETAMINOPHEN 5; 325 MG/1; MG/1
1-2 TABLET ORAL EVERY 4 HOURS PRN
Qty: 15 TABLET | Refills: 0 | Status: SHIPPED | OUTPATIENT
Start: 2017-01-01 | End: 2017-01-01

## 2017-01-01 RX ORDER — HYDROMORPHONE HYDROCHLORIDE 1 MG/ML
.3-.5 INJECTION, SOLUTION INTRAMUSCULAR; INTRAVENOUS; SUBCUTANEOUS
Status: DISCONTINUED | OUTPATIENT
Start: 2017-01-01 | End: 2017-01-01 | Stop reason: HOSPADM

## 2017-01-01 RX ORDER — OXYCODONE AND ACETAMINOPHEN 5; 325 MG/1; MG/1
1-2 TABLET ORAL EVERY 4 HOURS PRN
Qty: 15 TABLET | Refills: 0 | Status: CANCELLED | OUTPATIENT
Start: 2017-01-01

## 2017-01-01 RX ORDER — HYDROMORPHONE HYDROCHLORIDE 1 MG/ML
0.5 INJECTION, SOLUTION INTRAMUSCULAR; INTRAVENOUS; SUBCUTANEOUS ONCE
Status: COMPLETED | OUTPATIENT
Start: 2017-01-01 | End: 2017-01-01

## 2017-01-01 RX ORDER — DICYCLOMINE HYDROCHLORIDE 10 MG/1
20 CAPSULE ORAL 4 TIMES DAILY PRN
Qty: 45 CAPSULE | Refills: 0 | Status: SHIPPED | OUTPATIENT
Start: 2017-01-01 | End: 2017-01-01

## 2017-01-01 RX ORDER — MECLIZINE HCL 12.5 MG 12.5 MG/1
12.5 TABLET ORAL 3 TIMES DAILY PRN
Status: DISCONTINUED | OUTPATIENT
Start: 2017-01-01 | End: 2017-01-01 | Stop reason: HOSPADM

## 2017-01-01 RX ORDER — SODIUM CHLORIDE 9 MG/ML
INJECTION, SOLUTION INTRAVENOUS CONTINUOUS
Status: DISCONTINUED | OUTPATIENT
Start: 2017-01-01 | End: 2017-01-01 | Stop reason: HOSPADM

## 2017-01-01 RX ORDER — OXYCODONE HYDROCHLORIDE 5 MG/1
5-10 TABLET ORAL
Status: DISCONTINUED | OUTPATIENT
Start: 2017-01-01 | End: 2017-01-01 | Stop reason: HOSPADM

## 2017-01-01 RX ORDER — NORTRIPTYLINE HCL 10 MG
10 CAPSULE ORAL DAILY
Status: DISCONTINUED | OUTPATIENT
Start: 2017-01-01 | End: 2017-01-01 | Stop reason: HOSPADM

## 2017-01-01 RX ORDER — LORAZEPAM 0.5 MG/1
0.5 TABLET ORAL EVERY 8 HOURS PRN
Qty: 30 TABLET | Refills: 0 | Status: SHIPPED | OUTPATIENT
Start: 2017-01-01 | End: 2017-01-01

## 2017-01-01 RX ORDER — ROPINIROLE 0.5 MG/1
TABLET, FILM COATED ORAL
Qty: 180 TABLET | Refills: 1 | Status: SHIPPED | OUTPATIENT
Start: 2017-01-01 | End: 2017-01-01

## 2017-01-01 RX ORDER — ASPIRIN 81 MG
100 TABLET, DELAYED RELEASE (ENTERIC COATED) ORAL DAILY
Qty: 30 TABLET | Refills: 1 | Status: SHIPPED | OUTPATIENT
Start: 2017-01-01 | End: 2017-01-01

## 2017-01-01 RX ORDER — DICYCLOMINE HYDROCHLORIDE 10 MG/1
20 CAPSULE ORAL 4 TIMES DAILY PRN
Qty: 120 CAPSULE | Refills: 1 | Status: SHIPPED | OUTPATIENT
Start: 2017-01-01

## 2017-01-01 RX ORDER — CLONAZEPAM 0.5 MG/1
0.25 TABLET ORAL
Status: DISCONTINUED | OUTPATIENT
Start: 2017-01-01 | End: 2017-01-01 | Stop reason: HOSPADM

## 2017-01-01 RX ORDER — ONDANSETRON 4 MG/1
4 TABLET, ORALLY DISINTEGRATING ORAL EVERY 6 HOURS PRN
Status: DISCONTINUED | OUTPATIENT
Start: 2017-01-01 | End: 2017-01-01 | Stop reason: HOSPADM

## 2017-01-01 RX ORDER — ROPINIROLE 1 MG/1
1 TABLET, FILM COATED ORAL ONCE
Status: COMPLETED | OUTPATIENT
Start: 2017-01-01 | End: 2017-01-01

## 2017-01-01 RX ORDER — AMLODIPINE BESYLATE 5 MG/1
5 TABLET ORAL EVERY EVENING
Status: DISCONTINUED | OUTPATIENT
Start: 2017-01-01 | End: 2017-01-01

## 2017-01-01 RX ORDER — PROCHLORPERAZINE 25 MG
12.5 SUPPOSITORY, RECTAL RECTAL EVERY 12 HOURS PRN
Status: DISCONTINUED | OUTPATIENT
Start: 2017-01-01 | End: 2017-01-01 | Stop reason: HOSPADM

## 2017-01-01 RX ADMIN — OXYCODONE HYDROCHLORIDE 5 MG: 5 TABLET ORAL at 22:48

## 2017-01-01 RX ADMIN — SODIUM CHLORIDE 1000 ML: 9 INJECTION, SOLUTION INTRAVENOUS at 13:10

## 2017-01-01 RX ADMIN — SODIUM CHLORIDE 65 ML: 9 INJECTION, SOLUTION INTRAVENOUS at 13:23

## 2017-01-01 RX ADMIN — IOPAMIDOL 100 ML: 755 INJECTION, SOLUTION INTRAVENOUS at 20:11

## 2017-01-01 RX ADMIN — HYOSCYAMINE SULFATE 125 MCG: 0.12 TABLET ORAL at 19:18

## 2017-01-01 RX ADMIN — FAMOTIDINE 20 MG: 10 INJECTION, SOLUTION INTRAVENOUS at 11:05

## 2017-01-01 RX ADMIN — SODIUM CHLORIDE: 9 INJECTION, SOLUTION INTRAVENOUS at 22:48

## 2017-01-01 RX ADMIN — IOPAMIDOL 100 ML: 755 INJECTION, SOLUTION INTRAVENOUS at 13:23

## 2017-01-01 RX ADMIN — ROPINIROLE HYDROCHLORIDE 10 MG: 1 TABLET, FILM COATED ORAL at 10:37

## 2017-01-01 RX ADMIN — IOPAMIDOL 125 ML: 755 INJECTION, SOLUTION INTRAVENOUS at 14:37

## 2017-01-01 RX ADMIN — LOSARTAN POTASSIUM 100 MG: 25 TABLET, FILM COATED ORAL at 08:37

## 2017-01-01 RX ADMIN — ONDANSETRON 4 MG: 2 INJECTION INTRAMUSCULAR; INTRAVENOUS at 11:06

## 2017-01-01 RX ADMIN — Medication 0.5 MG: at 11:05

## 2017-01-01 RX ADMIN — HYDROMORPHONE HYDROCHLORIDE 0.5 MG: 1 INJECTION, SOLUTION INTRAMUSCULAR; INTRAVENOUS; SUBCUTANEOUS at 13:07

## 2017-01-01 RX ADMIN — OXYCODONE HYDROCHLORIDE 10 MG: 5 TABLET ORAL at 10:57

## 2017-01-01 RX ADMIN — ROPINIROLE HYDROCHLORIDE 1 MG: 1 TABLET, FILM COATED ORAL at 10:37

## 2017-01-01 RX ADMIN — SODIUM CHLORIDE: 9 INJECTION, SOLUTION INTRAVENOUS at 08:44

## 2017-01-01 RX ADMIN — ROPINIROLE 1 MG: 1 TABLET, FILM COATED ORAL at 12:54

## 2017-01-01 RX ADMIN — SODIUM CHLORIDE 80 ML: 9 INJECTION, SOLUTION INTRAVENOUS at 14:37

## 2017-01-01 RX ADMIN — SODIUM CHLORIDE 1000 ML: 9 INJECTION, SOLUTION INTRAVENOUS at 19:21

## 2017-01-01 RX ADMIN — SODIUM CHLORIDE 1000 ML: 9 INJECTION, SOLUTION INTRAVENOUS at 11:03

## 2017-01-01 RX ADMIN — SODIUM CHLORIDE 66 ML: 9 INJECTION, SOLUTION INTRAVENOUS at 20:12

## 2017-01-01 RX ADMIN — SODIUM CHLORIDE 86 ML: 9 INJECTION, SOLUTION INTRAVENOUS at 12:26

## 2017-01-01 RX ADMIN — HYDROMORPHONE HYDROCHLORIDE 0.5 MG: 1 INJECTION, SOLUTION INTRAMUSCULAR; INTRAVENOUS; SUBCUTANEOUS at 15:40

## 2017-01-01 RX ADMIN — IOPAMIDOL 73 ML: 755 INJECTION, SOLUTION INTRAVENOUS at 12:26

## 2017-01-01 ASSESSMENT — ENCOUNTER SYMPTOMS
NAUSEA: 1
FREQUENCY: 0
NUMBNESS: 0
HEMATURIA: 0
FEVER: 0
ARTHRALGIAS: 1
MYALGIAS: 1
CONSTIPATION: 0
FEVER: 1
VOMITING: 0
FATIGUE: 1
ABDOMINAL PAIN: 1
SORE THROAT: 0
COUGH: 0
ABDOMINAL PAIN: 1
COUGH: 0
DYSURIA: 0
VOMITING: 0
DIFFICULTY URINATING: 0
CONSTIPATION: 1
BLOOD IN STOOL: 0
WEAKNESS: 0
CHEST TIGHTNESS: 0
DIARRHEA: 0
SHORTNESS OF BREATH: 0
ABDOMINAL PAIN: 1
FLANK PAIN: 0
HEADACHES: 0
FEVER: 0
DYSURIA: 0
NAUSEA: 1
BACK PAIN: 1
VOMITING: 1
DIARRHEA: 1
FREQUENCY: 1

## 2017-01-01 ASSESSMENT — PATIENT HEALTH QUESTIONNAIRE - PHQ9
SUM OF ALL RESPONSES TO PHQ QUESTIONS 1-9: 17
SUM OF ALL RESPONSES TO PHQ QUESTIONS 1-9: 13
SUM OF ALL RESPONSES TO PHQ QUESTIONS 1-9: 2

## 2017-01-01 ASSESSMENT — PAIN DESCRIPTION - DESCRIPTORS: DESCRIPTORS: PRESSURE;TENDER

## 2017-01-01 ASSESSMENT — PAIN SCALES - GENERAL: PAINLEVEL: SEVERE PAIN (7)

## 2017-01-18 NOTE — TELEPHONE ENCOUNTER
1.  Date/reason for appt: 2/3/17, Dizziness & restless leg syndrome   2.  Referring provider: NISHA CAMACHO  3.  Call to patient (Yes / No - short description):No, referred  4.  Previous care at / records requested from:   [facility]- office notes are in epic.

## 2017-01-23 NOTE — TELEPHONE ENCOUNTER
Patient calling to report constant irregular heartbeat x 5 days, feels that her heart is skipping a beats, not a consistent pulse pattern, more irregular than fast. The symptoms do not stop unless she is sleeping. Notices it hurts to take a deep breath.   She denies chest, neck, jaw or arm pain, difficulty breathing, lightheadedness. Has h/o of vertigo, has some dizziness.   Had increase in fibromyalgia pain lately. Patient believes it's anxiety & fibromyalgia related, not heart related.     Advised on an appointment today for EKG and eval, discussed my concerns & expressed my urgency for her to be seen. Patient refuses.   She wants to see Dr. Bowden to discuss pain regimen for her fibromyalgia pain. Believes once her pains controlled her heart rate will regulate.   I scheduled an appointment for 1/25/17. Advised patient to call tomorrow to check for cancellations.     Routing to Dr. Bowden for FYI.

## 2017-01-24 NOTE — TELEPHONE ENCOUNTER
Noted.  I would recommend she be seen by someone tomorrow for an EKG, and then we can follow up on the fibro pain together.    Please let me know if you have questions or concerns.    Tegan Bowden MD

## 2017-01-25 NOTE — NURSING NOTE
"Chief Complaint   Patient presents with     Irregular Heart Beat     Fibromyalgia       Initial /82 mmHg  Pulse 90  Temp(Src) 97.7  F (36.5  C) (Tympanic)  Ht 5' 5\" (1.651 m)  Wt 215 lb 7 oz (97.722 kg)  BMI 35.85 kg/m2  SpO2 97% Estimated body mass index is 35.85 kg/(m^2) as calculated from the following:    Height as of this encounter: 5' 5\" (1.651 m).    Weight as of this encounter: 215 lb 7 oz (97.722 kg).  BP completed using cuff size: large  Lorrie Chiem, CMA      "

## 2017-01-25 NOTE — MR AVS SNAPSHOT
After Visit Summary   1/25/2017    Rosemarie Dolan    MRN: [id number]           Patient Information     Date Of Birth          1945        Visit Information        Provider Department      1/25/2017 11:20 AM Tegan Bowden MD [facility]        Today's Diagnoses     Irregular heartbeat    -  1     Fibromyalgia         Anxiety         Restless legs syndrome (RLS)            Follow-ups after your visit        Your next 10 appointments already scheduled     Feb 03, 2017  9:10 AM   (Arrive by 8:55 AM)   New Patient Visit with Von Brizuela MD   [facility] ([facility])    [address] [place] [id number]   [phone]            Feb 08, 2017  1:00 PM   Office Visit with Tegan Bowden MD   [facility] ([facility]    [address] [place] [address]  [address]  [facility] [id number] [phone]           Bring a current list of meds and any records pertaining to this visit.  For Physicals, please bring immunization records and any forms needing to be filled out.  Please arrive 10 minutes early to complete paperwork.            May 09, 2017  9:00 AM   Office Visit with Tegan Bowden MD   [facility] ([facility])    [address] [place] [address]  [address]  [facility] [id number] [phone]           Bring a current list of meds and any records pertaining to this visit.  For Physicals, please bring immunization records and any forms needing to be filled out.  Please arrive 10 minutes early to complete paperwork.            Nov 30, 2017 10:00 AM   Return Sleep Patient with Aldo Longoria MD   [facility] ([facility])    [address]  [facility] [address]   [phone]              Future tests that were ordered for you today     Open Future Orders        Priority Expected Expires Ordered     "Holter Monitor 48 hour - Adult Routine  3/11/2017 1/25/2017            Who to contact     If you have questions or need follow up information about today's clinic visit or your schedule please contact [facility] directly at [phone].  Normal or non-critical lab and imaging results will be communicated to you by MyChart, letter or phone within 4 business days after the clinic has received the results. If you do not hear from us within 7 days, please contact the clinic through [facility] or phone. If you have a critical or abnormal lab result, we will notify you by phone as soon as possible.  Submit refill requests through [facility] or call your pharmacy and they will forward the refill request to us. Please allow 3 business days for your refill to be completed.          Additional Information About Your Visit        [facility]oSae[facility]maps Information     [facility] gives you secure access to your electronic health record. If you see a primary care provider, you can also send messages to your care team and make appointments. If you have questions, please call your primary care clinic.  If you do not have a primary care provider, please call [phone] and they will assist you.        Care EveryWhere ID     This is your Care EveryWhere ID. This could be used by other organizations to access your [facility] medical records  [id number]        Your Vitals Were     Pulse Temperature Height BMI (Body Mass Index) Pulse Oximetry       90 97.7  F (36.5  C) (Tympanic) 5' 5\" (1.651 m) 35.85 kg/m2 97%        Blood Pressure from Last 3 Encounters:   01/24/17 150/82   12/21/16 130/68   12/18/16 171/85    Weight from Last 3 Encounters:   01/24/17 215 lb 7 oz (97.722 kg)   12/21/16 212 lb 7 oz (96.361 kg)   12/18/16 200 lb (90.719 kg)              We Performed the Following     Basic metabolic panel     EKG 12-lead complete w/read - Clinics          Today's Medication Changes          These changes are accurate as of: 1/25/17 12:29 PM.  " If you have any questions, ask your nurse or doctor.               Start taking these medicines.        Dose/Directions    DULoxetine 30 MG EC capsule   Commonly known as:  CYMBALTA   Used for:  Fibromyalgia, Anxiety   Started by:  Tegan Bowden MD        Dose:  30 mg   Take 1 capsule (30 mg) by mouth daily   Quantity:  30 capsule   Refills:  1       LORazepam 0.5 MG tablet   Commonly known as:  ATIVAN   Used for:  Anxiety   Started by:  Tegan Bowden MD        Dose:  0.5 mg   Take 1 tablet (0.5 mg) by mouth every 8 hours as needed for anxiety   Quantity:  30 tablet   Refills:  0         These medicines have changed or have updated prescriptions.        Dose/Directions    * rOPINIRole 1 MG tablet   Commonly known as:  REQUIP   This may have changed:  Another medication with the same name was added. Make sure you understand how and when to take each.   Used for:  Restless legs syndrome (RLS)   Changed by:  Tegan Bowden MD        Dose:  1 mg   Take 1 tablet (1 mg) by mouth 3 times daily   Quantity:  270 tablet   Refills:  3       * rOPINIRole 0.5 MG tablet   Commonly known as:  REQUIP   This may have changed:  You were already taking a medication with the same name, and this prescription was added. Make sure you understand how and when to take each.   Used for:  Restless legs syndrome (RLS)   Changed by:  Tegan Bowden MD        Take 0.5 mg in the morning, 0.5 mg mid-day and 1 mg at bedtime   Quantity:  180 tablet   Refills:  1       * Notice:  This list has 2 medication(s) that are the same as other medications prescribed for you. Read the directions carefully, and ask your doctor or other care provider to review them with you.         Where to get your medicines      These medications were sent to [id number] - [place], [place] - [address] [facility]  [address] [address]     Phone:  [phone]    - DULoxetine 30 MG EC capsule  - rOPINIRole 0.5 MG  tablet      Some of these will need a paper prescription and others can be bought over the counter.  Ask your nurse if you have questions.     Bring a paper prescription for each of these medications    - LORazepam 0.5 MG tablet             Primary Care Provider Office Phone # Fax #    Tegan Bowden [phone]       [facility] [address] [place] [address]        Thank you!     Thank you for choosing [facility]  for your care. Our goal is always to provide you with excellent care. Hearing back from our patients is one way we can continue to improve our services. Please take a few minutes to complete the written survey that you may receive in the mail after your visit with us. Thank you!             Your Updated Medication List - Protect others around you: Learn how to safely use, store and throw away your medicines at www.disposemymeds.org.          This list is accurate as of: 1/25/17 12:29 PM.  Always use your most recent med list.                   Brand Name Dispense Instructions for use    amLODIPine 5 MG tablet    NORVASC    90 tablet    Take 1 tablet (5 mg) by mouth daily       blood glucose monitoring lancets     1 Box    1 each 2 times daily       blood glucose monitoring test strip    ACCU-CHEK SMARTVIEW    1 Box    Test 1 time a day.       CALCIUM COMPLEX OR      Take by mouth daily 2 tablets daily       cycloSPORINE 0.05 % ophthalmic emulsion    RESTASIS    6 Box    Apply 1 drop to eye every 12 hours       DULoxetine 30 MG EC capsule    CYMBALTA    30 capsule    Take 1 capsule (30 mg) by mouth daily       LORazepam 0.5 MG tablet    ATIVAN    30 tablet    Take 1 tablet (0.5 mg) by mouth every 8 hours as needed for anxiety       losartan 100 MG tablet    COZAAR    90 tablet    Take 1 tablet (100 mg) by mouth daily       magnesium gluconate 500 (27 MG) MG tablet    MAGONATE     Take 1 tablet (500 mg) by mouth 2 times daily       meclizine 25 MG tablet     ANTIVERT    90 tablet    Take 1 tablet (25 mg) by mouth 3 times daily as needed for dizziness       nortriptyline 10 MG capsule    PAMELOR    90 capsule    Take 1 capsule (10 mg) by mouth At Bedtime       * order for DME     1 Units    Equipment being ordered: TENS       * order for DME     1 Device    Equipment being ordered: SADD lights-10,000 Lux       * order for DME     1 each    Equipment being ordered: Wrist splint       PROBIOTIC PO      Take 1 capsule by mouth daily as needed As needed       * rOPINIRole 1 MG tablet    REQUIP    270 tablet    Take 1 tablet (1 mg) by mouth 3 times daily       * rOPINIRole 0.5 MG tablet    REQUIP    180 tablet    Take 0.5 mg in the morning, 0.5 mg mid-day and 1 mg at bedtime       * STATIN NOT PRESCRIBED (INTENTIONAL)     0 each    Statin not prescribed intentionally due to Intolerance       traMADol 50 MG tablet    ULTRAM    20 tablet    Take 1-2 tablets ( mg) by mouth every 6 hours as needed for pain       UNKNOWN MED DOSAGE      Mobysil cream-1 application prn joint pain       VITAMIN B-12 PO          vitamin D 2000 UNITS Caps     30 capsule    Take 2 tablets by mouth daily       zolpidem 5 MG tablet    AMBIEN    30 tablet    Take 1 tablet (5 mg) by mouth nightly as needed for sleep       * Notice:  This list has 6 medication(s) that are the same as other medications prescribed for you. Read the directions carefully, and ask your doctor or other care provider to review them with you.

## 2017-01-25 NOTE — PROGRESS NOTES
"  SUBJECTIVE:                                                    Rosemarie Dolan is a 71 year old female who presents to clinic today for the following health issues:        Depression and Anxiety Follow-Up    Status since last visit: Worsened     Other associated symptoms:fibromyalgia, irregular heart beat, headaches, vertigo, insomnia    Complicating factors:     Significant life event: Yes-  Pt is taking care of spouse (spouse had surgery)     Current substance abuse: None    PHQ-9 SCORE 7/14/2016 11/8/2016 12/21/2016   Total Score - - -   Total Score - - -   Total Score 4 7 6     NATE-7 SCORE 2/6/2015 3/20/2015 12/21/2016   Total Score 1 1 -   Total Score - - 3        PHQ-9  English      PHQ-9   Any Language     GAD7         Amount of exercise or physical activity: None    Problems taking medications regularly: No    Medication side effects: none    Diet: regular (no restrictions)     recently diagnosed with prostate cancer.  Had to have a heart work up, that looked ok.  Had prostate surgery a few days later.  Has had to do more driving, had to take care of her .  She notes more pain when this started, notes that she has accidentally given him the wrong pills.  She doesn't feel that she can care for him.  She notes she can't calm down due to stress, is up all night due to pain, is sometimes crying at night.  She notes poor quality of life, feels that she can't \"do it any more\".  She is relying on her azeb to help her calm down.  Notes she is having irregular heartbeat over the past 5 days.  Feels that she needs to do \"something, but I don't know what\".  Notes she can feel her heartbeat in her throat, notes pauses and then restarting.  She notes chest pain, but feels that it is muscle pain.  She notes that the palpitations improved on their own in the past 5 days, still having some but has \"settled down quite a bit\".  She does have some shortness of breath with exertion but not with the " "palpitations.   Feels that yesterday was a better day, but last night couldn't sleep due to RLS pain and anxiety.        Problem list and histories reviewed & adjusted, as indicated.  Additional history: as documented    Problem list, Medication list, Allergies, and Medical/Social/Surgical histories reviewed in [facility] and updated as appropriate.    ROS:  Constitutional, HEENT, cardiovascular, pulmonary, gi and gu systems are negative, except as otherwise noted.    OBJECTIVE:                                                    /82 mmHg  Pulse 90  Temp(Src) 97.7  F (36.5  C) (Tympanic)  Ht 5' 5\" (1.651 m)  Wt 215 lb 7 oz (97.722 kg)  BMI 35.85 kg/m2  SpO2 97%  Body mass index is 35.85 kg/(m^2).  GENERAL:  Alert.  Tearful and anxious throughout visit.  Pt also sometimes grimacing in pain randomly through visit.    EYES: Eyes grossly normal to inspection, PERRL and conjunctivae and sclerae normal  HENT: ear canals and TM's normal, nose and mouth without ulcers or lesions  NECK: no adenopathy, no asymmetry, masses,   RESP: lungs clear to auscultation - no rales, rhonchi or wheezes  CV: regular rate and rhythm, normal S1 S2, no S3 or S4, no murmur, click or rub, no peripheral edema   ABDOMEN: soft, no hepatosplenomegaly, no masses and bowel sounds normal.  Mildly diffusely tender throughout abdomen  MS: no gross musculoskeletal defects noted, no edema    Diagnostic Test Results:  none      ASSESSMENT/PLAN:                                                    I spent 40 min face to face with patient over 50% in counseling on issues as detailed below.      (I49.9) Irregular heartbeat  (primary encounter diagnosis)  -EKG read by me as normal, no palpitations or irregular heartbeats heard on exam   -will check electrolytes, thyroid recently checked and was normal.   -holter monitor  Plan: EKG 12-lead complete w/read - Clinics, Basic         metabolic panel, Holter Monitor 48 hour - Adult       (M79.7) Fibromyalgia  -pt " appears to be having flare of fibro pain   -discussed options with her- she has not tolerated lyrica or gabapentin  -pt did tolerate cymbalta in the past but stopped it as she didn't think it was helping;however that was when she was still on narcotics  -will do trial of cymbalta, start at low dose  Plan: DULoxetine (CYMBALTA) 30 MG EC capsule            (F41.9) Anxiety  -discussed with her that her anxiety is likely driving a lot of her symptoms  -will start trial of cymbalta to help with pain control as well   -can try small amount of ativan to help with acute flare ups in anxiety  -pt is aware that this is addictive and is not something I will keep her on long term   Plan: DULoxetine (CYMBALTA) 30 MG EC capsule,         LORazepam (ATIVAN) 0.5 MG tablet         (G25.81) Restless legs syndrome (RLS)  -refill    Plan: rOPINIRole (REQUIP) 0.5 MG tablet      FUTURE APPOINTMENTS:       - Follow-up visit in 4 weeks, scheduled.     Tegan Bowden MD  [facility]

## 2017-02-01 NOTE — MR AVS SNAPSHOT
After Visit Summary   2/1/2017    Rosemarie Dolan    MRN: [id number]           Patient Information     Date Of Birth          1945        Visit Information        Provider Department      2/1/2017 3:00 PM Von Brizuela MD [facility] Neurology        Today's Diagnoses     Restless leg syndrome    -  1        Follow-ups after your visit        Follow-up notes from your care team     Return in about 2 months (around 4/1/2017).      Your next 10 appointments already scheduled     Feb 08, 2017  1:00 PM   Office Visit with Tegan Bowden MD   [facility]    [address] [place] [address]  [address]  [facility] [id number] [phone]           Bring a current list of meds and any records pertaining to this visit.  For Physicals, please bring immunization records and any forms needing to be filled out.  Please arrive 10 minutes early to complete paperwork.            Apr 14, 2017  9:40 AM   (Arrive by 9:25 AM)   Return Visit with Von Brizuela MD   [facility] Neurology ([facility])    [address] [place] [id number] [phone]            May 09, 2017  9:00 AM   Office Visit with Tegan Bowden MD   [facility] ([facility])    [address] [place] [address]  [address]  [facility] [id number] [phone]           Bring a current list of meds and any records pertaining to this visit.  For Physicals, please bring immunization records and any forms needing to be filled out.  Please arrive 10 minutes early to complete paperwork.            Nov 30, 2017 10:00 AM   Return Sleep Patient with Aldo Longoria MD   [facility] ([facility])    [address] [place] [id number] [phone]              Who to contact     Please call your clinic at [phone] to:    Ask questions about your health    Make or  "cancel appointments    Discuss your medicines    Learn about your test results    Speak to your doctor   If you have compliments or concerns about an experience at your clinic, or if you wish to file a complaint, please contact [facility] Patient Relations at [phone] or email us at [email or web address]         Additional Information About Your Visit        [employer] Information     [employer] gives you secure access to your electronic health record. If you see a primary care provider, you can also send messages to your care team and make appointments. If you have questions, please call your primary care clinic.  If you do not have a primary care provider, please call [phone] and they will assist you.      [employer] is an electronic gateway that provides easy, online access to your medical records. With [employer], you can request a clinic appointment, read your test results, renew a prescription or communicate with your care team.     To access your existing account, please contact your [facility] or call [phone] for assistance.        Care EveryWhere ID     This is your Care EveryWhere ID. This could be used by other organizations to access your [place] medical records  [id number]        Your Vitals Were     Pulse Respirations Height BMI (Body Mass Index) Pulse Oximetry Breastfeeding?    78 24 1.676 m (5' 6\") 34.23 kg/m2 96% No       Blood Pressure from Last 3 Encounters:   02/01/17 154/61   01/24/17 150/82   12/21/16 130/68    Weight from Last 3 Encounters:   02/01/17 96.163 kg (212 lb)   01/24/17 97.722 kg (215 lb 7 oz)   12/21/16 96.361 kg (212 lb 7 oz)              Today, you had the following     No orders found for display         Today's Medication Changes          These changes are accurate as of: 2/1/17  3:43 PM.  If you have any questions, ask your nurse or doctor.               Start taking these medicines.        " Dose/Directions    clonazePAM 0.5 MG tablet   Commonly known as:  klonoPIN   Used for:  Restless leg syndrome   Started by:  Von Brizuela MD        Dose:  0.25 mg   Take 0.5 tablets (0.25 mg) by mouth See Admin Instructions One-half po q hs.  May repeat dose in one hour if RLS persist.   Quantity:  30 tablet   Refills:  0         Stop taking these medicines if you haven't already. Please contact your care team if you have questions.     DULoxetine 30 MG EC capsule   Commonly known as:  CYMBALTA   Stopped by:  Von Brizuela MD           traMADol 50 MG tablet   Commonly known as:  ULTRAM   Stopped by:  Von Brizuela MD                Where to get your medicines      Some of these will need a paper prescription and others can be bought over the counter.  Ask your nurse if you have questions.     Bring a paper prescription for each of these medications    - clonazePAM 0.5 MG tablet             Primary Care Provider Office Phone # Fax #    Tegan Bowden [phone]       [facility] [address] [place] [address]        Thank you!     Thank you for choosing [facility] NEUROLOGY  for your care. Our goal is always to provide you with excellent care. Hearing back from our patients is one way we can continue to improve our services. Please take a few minutes to complete the written survey that you may receive in the mail after your visit with us. Thank you!             Your Updated Medication List - Protect others around you: Learn how to safely use, store and throw away your medicines at www.disposemymeds.org.          This list is accurate as of: 2/1/17  3:43 PM.  Always use your most recent med list.                   Brand Name Dispense Instructions for use    amLODIPine 5 MG tablet    NORVASC    90 tablet    Take 1 tablet (5 mg) by mouth daily       blood glucose monitoring lancets     1 Box    1 each 2 times daily       blood glucose monitoring test strip     ACCU-CHEK SMARTVIEW    1 Box    Test 1 time a day.       CALCIUM COMPLEX OR      Take by mouth daily 2 tablets daily       clonazePAM 0.5 MG tablet    klonoPIN    30 tablet    Take 0.5 tablets (0.25 mg) by mouth See Admin Instructions One-half po q hs.  May repeat dose in one hour if RLS persist.       cycloSPORINE 0.05 % ophthalmic emulsion    RESTASIS    6 Box    Apply 1 drop to eye every 12 hours       LORazepam 0.5 MG tablet    ATIVAN    30 tablet    Take 1 tablet (0.5 mg) by mouth every 8 hours as needed for anxiety       losartan 100 MG tablet    COZAAR    90 tablet    Take 1 tablet (100 mg) by mouth daily       magnesium gluconate 500 (27 MG) MG tablet    MAGONATE     Take 1 tablet (500 mg) by mouth 2 times daily       meclizine 25 MG tablet    ANTIVERT    90 tablet    Take 1 tablet (25 mg) by mouth 3 times daily as needed for dizziness       nortriptyline 10 MG capsule    PAMELOR    90 capsule    Take 1 capsule (10 mg) by mouth At Bedtime       * order for DME     1 Units    Equipment being ordered: TENS       * order for DME     1 Device    Equipment being ordered: SADD lights-10,000 Lux       * order for DME     1 each    Equipment being ordered: Wrist splint       PROBIOTIC PO      Take 1 capsule by mouth daily as needed As needed       * rOPINIRole 1 MG tablet    REQUIP    270 tablet    Take 1 tablet (1 mg) by mouth 3 times daily       * rOPINIRole 0.5 MG tablet    REQUIP    180 tablet    Take 0.5 mg in the morning, 0.5 mg mid-day and 1 mg at bedtime       * STATIN NOT PRESCRIBED (INTENTIONAL)     0 each    Statin not prescribed intentionally due to Intolerance       UNKNOWN MED DOSAGE      Mobysil cream-1 application prn joint pain       VITAMIN B-12 PO          vitamin D 2000 UNITS Caps     30 capsule    Take 2 tablets by mouth daily       zolpidem 5 MG tablet    AMBIEN    30 tablet    Take 1 tablet (5 mg) by mouth nightly as needed for sleep       * Notice:  This list has 6 medication(s) that are  the same as other medications prescribed for you. Read the directions carefully, and ask your doctor or other care provider to review them with you.

## 2017-02-01 NOTE — Clinical Note
2/1/2017       RE: Rosemarie Dolan  [address] [place] [address]     Dear Colleague,    Thank you for referring your patient, Rosemarie Dolan, to the [facility] NEUROLOGY at [facility]. Please see a copy of my visit note below.    REASON FOR VISIT:   This patient is a 71-year-old right-handed woman seen at the request of Dr. Cook for neurologic consultation with chief complaint of restless legs syndrome, dizziness and imbalance and episodes of jerking and loss of consciousness.        HISTORY OF PRESENT ILLNESS:   The patient has a complicated history.  She is here with her , Niraj.  She has severe restless leg syndrome.  This involves jerking of her legs.  She cannot get relaxed.  She has pain in her legs.  If she gets up and walks, she feels better.  She has had fibromyalgia for over 10 years.  For 9 years she was on OxyContin.  The dose kept getting increased.  It caused constipation and central apnea.  The restless legs syndrome appeared as the narcotics were reduced.  The restless legs are almost intolerable.  She is currently on Requip 1 mg 3 times a day and if she has breakthrough symptoms, she will take an additional 0.5 mg.  Her legs are a major problem for her.  She cannot get relaxed.  Her sleep is poor.  She never feels rested.  She has both central sleep apnea and obstructive sleep apnea.  She is on CPAP.  She takes nortriptyline 10 mg at night.  For the restless legs,  she had been on gabapentin, but that caused anxiety, Lyrica caused palpitations.  When she went through narcotics withdrawal she was on clonidine.  There is a fair amount of stress in the household as her  has a new diagnosis of prostate cancer.  The patient is currently being evaluated for heart palpitations.  She complains of muscle tightness, fatigue, leg pain.  The symptoms are constant and she is extremely frustrated with them.  In addition, she had an episode of  "fainting.  This occurred about a year ago or so.  She was nauseated and awoke and had had an emesis.  She also had an episode that occurred on a bus this summer past.  She was on her way [place], [place].  She became dizzy and nauseated.  She seemed to be in a daze.  She did not pass out.  A friend that was with her said that she was \"not there.\"  She went to the bathroom and then felt better.  It does not sound like she actually passed out.  She does have jerking movements when she is trying to get to sleep.  These are total body jerks.  She also has them during sleep.  Otherwise, though, she has never lost consciousness.  She has not had episodes involving urinary incontinence or tongue biting.  Her  said that he has never seen anything that looked like a seizure-type event.  She never feels rested.      She has had cataract surgery and has intense photophobia.  She has to wear sunglasses if the sun is out.  She has no problem with hearing, speech or swallowing.  She does have numbness and tingling in her legs and feet especially.  She was diagnosed with diabetes several years ago.  It is diet-controlled.  Her recent hemoglobin A1c was 5.5.  She was told a couple years ago that she \"had neuropathy.\"  She has never had an evaluation for that.      PAST MEDICAL HISTORY:   Her past medical history is significant for high blood pressure.  She does not have thyroid or asthma.  She does not drink or smoke.  She has irritable bowel.  That is why she is on nortriptyline.  She has no history of head or neck trauma.  She has not had pertinent surgery.        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      FAMILY HISTORY:  Positive for chronic pain in her mother.      PHYSICAL EXAMINATION:   GENERAL:   The patient is cooperative and in no distress.     VITAL SIGNS:   Her blood pressure is 154/61.     There are no carotid bruits.  Auscultation of the heart shows S1, S2, without murmur, " rub or gallop.  Chest is clear to auscultation.  On neurologic exam, the patient is alert, oriented and lucid.  Cranial nerve testing shows full visual fields to confrontation.  Funduscopic exam shows sharp discs bilaterally.  Eye movements are complete and conjugate without nystagmus.  Pupils react to light.  Facies are symmetric.  Tongue protrudes in the midline.  Motor evaluation shows no pronator drift, normal finger tapping, finger-nose-finger and heel-knee-shin.  Strength appears preserved in the arms, hands, legs and feet.  Muscle stretch reflexes are low amplitude and symmetric in the arms and knees and trace at the ankles.  Toes are downgoing.  Sensory exam shows absent vibratory sense at the toes and diminished at the ankles.  Vibration and temperature are normal in the hands.  Temperature sense appears reduced in the feet.  Romberg sign is somewhat present.  She can get up on her heels and toes, but is unable to perform a tandem gait.      ASSESSMENT:   1.  Restless leg syndrome and sleep myoclonus.   2.  Probable episode of syncope 1 year ago.   3.  Neuropathy.      DISCUSSION:  The patient is seen with the above problem list.  Her exam does show some findings of neuropathy with reduced ankle reflexes and impaired small and large fiber sensation in the feet.  She did have an MRI of the brain performed that was stroke protocol.  This was in 12/2016.  The brain study showed no acute abnormality and the MR angiogram of the head and neck was normal.  Ferritin level in 12/2015 was normal.  A vitamin B12 level in 02/2016 was 1180.      The patient's main complaint has to do with the restless leg syndrome.  She is developing augmentation with the Requip.  She is finding it less effective.  Options are going to be quite limited for treatment.  I have recommended a trial of clonazepam 0.5 mg.  She could take a half dose before going to bed and repeat the dose if it does seem to be helpful.  If she finds the  clonazepam effective, then she could taper off the Requip.  I would not recommend her using lorazepam in addition to the clonazepam.  She has a prescription for duloxetine.  She said she took 1 dose and felt ill.  I told her that duloxetine could conceivably make the restless legs syndrome worse.  Nortriptyline also could have that side effect.  She has tramadol, but has not been using it.  Tramadol could conceivably also exacerbate restless leg syndrome.  It would be my recommendation that she go with the clonazepam and she agreed.      The jerking movements that she describes are probably sleep myoclonus.  She has a complicated sleep disorder with central and obstructive sleep apnea in addition to sleep myoclonus and restless legs syndrome.  She is on CPAP.  I am not sure there is much more to be done in that regard.      I did discuss with her neuropathy and what the workup would involve including lab tests and electrodiagnostic testing.  She has no interest in pursuing that at the current time.  I will see her in followup in 2 months for reexamination.  She knows to call if there are questions or problems in the meantime.      Von Brizuela MD      cc:   Tegan Bowden MD   [facility]    [address] [place] [address]      Dimitri Cook MD   [facility]    [address]   [place] [address]              D: 2017 15:53   T: 2017 09:50   MT: AKA      Name:     ERNA BOOKER   MRN:      [id number]        Account:      [id number]   :      1945           Service Date: 2017      Document: [id number]

## 2017-02-01 NOTE — NURSING NOTE
Chief Complaint   Patient presents with     Consult     [facility]- DIZZINESS AND RESTLESS LEG SYNDROME

## 2017-02-02 NOTE — PROGRESS NOTES
REASON FOR VISIT:   This patient is a 71-year-old right-handed woman seen at the request of Dr. Cook for neurologic consultation with chief complaint of restless legs syndrome, dizziness and imbalance and episodes of jerking and loss of consciousness.        HISTORY OF PRESENT ILLNESS:   The patient has a complicated history.  She is here with her , Niraj.  She has severe restless leg syndrome.  This involves jerking of her legs.  She cannot get relaxed.  She has pain in her legs.  If she gets up and walks, she feels better.  She has had fibromyalgia for over 10 years.  For 9 years she was on OxyContin.  The dose kept getting increased.  It caused constipation and central apnea.  The restless legs syndrome appeared as the narcotics were reduced.  The restless legs are almost intolerable.  She is currently on Requip 1 mg 3 times a day and if she has breakthrough symptoms, she will take an additional 0.5 mg.  Her legs are a major problem for her.  She cannot get relaxed.  Her sleep is poor.  She never feels rested.  She has both central sleep apnea and obstructive sleep apnea.  She is on CPAP.  She takes nortriptyline 10 mg at night.  For the restless legs,  she had been on gabapentin, but that caused anxiety, Lyrica caused palpitations.  When she went through narcotics withdrawal she was on clonidine.  There is a fair amount of stress in the household as her  has a new diagnosis of prostate cancer.  The patient is currently being evaluated for heart palpitations.  She complains of muscle tightness, fatigue, leg pain.  The symptoms are constant and she is extremely frustrated with them.  In addition, she had an episode of fainting.  This occurred about a year ago or so.  She was nauseated and awoke and had had an emesis.  She also had an episode that occurred on a bus this summer past.  She was on her way [place], [place].  She became dizzy and nauseated.  She seemed to be in a daze.  She did not pass  "out.  A friend that was with her said that she was \"not there.\"  She went to the bathroom and then felt better.  It does not sound like she actually passed out.  She does have jerking movements when she is trying to get to sleep.  These are total body jerks.  She also has them during sleep.  Otherwise, though, she has never lost consciousness.  She has not had episodes involving urinary incontinence or tongue biting.  Her  said that he has never seen anything that looked like a seizure-type event.  She never feels rested.      She has had cataract surgery and has intense photophobia.  She has to wear sunglasses if the sun is out.  She has no problem with hearing, speech or swallowing.  She does have numbness and tingling in her legs and feet especially.  She was diagnosed with diabetes several years ago.  It is diet-controlled.  Her recent hemoglobin A1c was 5.5.  She was told a couple years ago that she \"had neuropathy.\"  She has never had an evaluation for that.      PAST MEDICAL HISTORY:   Her past medical history is significant for high blood pressure.  She does not have thyroid or asthma.  She does not drink or smoke.  She has irritable bowel.  That is why she is on nortriptyline.  She has no history of head or neck trauma.  She has not had pertinent surgery.        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      FAMILY HISTORY:  Positive for chronic pain in her mother.      PHYSICAL EXAMINATION:   GENERAL:   The patient is cooperative and in no distress.     VITAL SIGNS:   Her blood pressure is 154/61.     There are no carotid bruits.  Auscultation of the heart shows S1, S2, without murmur, rub or gallop.  Chest is clear to auscultation.  On neurologic exam, the patient is alert, oriented and lucid.  Cranial nerve testing shows full visual fields to confrontation.  Funduscopic exam shows sharp discs bilaterally.  Eye movements are complete and conjugate without nystagmus.  " Pupils react to light.  Facies are symmetric.  Tongue protrudes in the midline.  Motor evaluation shows no pronator drift, normal finger tapping, finger-nose-finger and heel-knee-shin.  Strength appears preserved in the arms, hands, legs and feet.  Muscle stretch reflexes are low amplitude and symmetric in the arms and knees and trace at the ankles.  Toes are downgoing.  Sensory exam shows absent vibratory sense at the toes and diminished at the ankles.  Vibration and temperature are normal in the hands.  Temperature sense appears reduced in the feet.  Romberg sign is somewhat present.  She can get up on her heels and toes, but is unable to perform a tandem gait.      ASSESSMENT:   1.  Restless leg syndrome and sleep myoclonus.   2.  Probable episode of syncope 1 year ago.   3.  Neuropathy.      DISCUSSION:  The patient is seen with the above problem list.  Her exam does show some findings of neuropathy with reduced ankle reflexes and impaired small and large fiber sensation in the feet.  She did have an MRI of the brain performed that was stroke protocol.  This was in 12/2016.  The brain study showed no acute abnormality and the MR angiogram of the head and neck was normal.  Ferritin level in 12/2015 was normal.  A vitamin B12 level in 02/2016 was 1180.      The patient's main complaint has to do with the restless leg syndrome.  She is developing augmentation with the Requip.  She is finding it less effective.  Options are going to be quite limited for treatment.  I have recommended a trial of clonazepam 0.5 mg.  She could take a half dose before going to bed and repeat the dose if it does seem to be helpful.  If she finds the clonazepam effective, then she could taper off the Requip.  I would not recommend her using lorazepam in addition to the clonazepam.  She has a prescription for duloxetine.  She said she took 1 dose and felt ill.  I told her that duloxetine could conceivably make the restless legs syndrome  worse.  Nortriptyline also could have that side effect.  She has tramadol, but has not been using it.  Tramadol could conceivably also exacerbate restless leg syndrome.  It would be my recommendation that she go with the clonazepam and she agreed.      The jerking movements that she describes are probably sleep myoclonus.  She has a complicated sleep disorder with central and obstructive sleep apnea in addition to sleep myoclonus and restless legs syndrome.  She is on CPAP.  I am not sure there is much more to be done in that regard.      I did discuss with her neuropathy and what the workup would involve including lab tests and electrodiagnostic testing.  She has no interest in pursuing that at the current time.  I will see her in followup in 2 months for reexamination.  She knows to call if there are questions or problems in the meantime.      Tien Lo MD      cc:   Tegan Bowden MD   [facility]    [address] [place] [address]      Dimitri Cook MD   [facility]    [address] [address]         TIEN LO MD             D: 2017 15:53   T: 2017 09:50   MT: AKA      Name:     ERNA BOOKER   MRN:      [id number]        Account:      [id number]   :      1945           Service Date: 2017      Document: [id number]

## 2017-02-06 PROBLEM — F41.9 ANXIETY: Status: ACTIVE | Noted: 2017-01-01

## 2017-02-08 NOTE — MR AVS SNAPSHOT
After Visit Summary   2/8/2017    Rosemarie Dolan    MRN: [id number]           Patient Information     Date Of Birth          1945        Visit Information        Provider Department      2/8/2017 1:00 PM Tegan Bowden MD [facility]        Today's Diagnoses     Restless legs syndrome (RLS)    -  1     Palpitations         Fibromyalgia         Anxiety         Bladder prolapse, female, acquired            Follow-ups after your visit        Your next 10 appointments already scheduled     Apr 14, 2017  9:40 AM   (Arrive by 9:25 AM)   Return Visit with Von Brizuela MD   [facility] ([facility])    [address]  3rd Floor  [place] [address]   [phone]            May 09, 2017  9:00 AM   Office Visit with Tegan Bowden MD   [facility] ([facility])    [address]  [place] [address] [phone]           Bring a current list of meds and any records pertaining to this visit.  For Physicals, please bring immunization records and any forms needing to be filled out.  Please arrive 10 minutes early to complete paperwork.            Nov 30, 2017 10:00 AM   Return Sleep Patient with Aldo Longoria MD   [facility] ([facility])    [address] [place] [address] [phone]              Who to contact     If you have questions or need follow up information about today's clinic visit or your schedule please contact [facility] directly at [phone].  Normal or non-critical lab and imaging results will be communicated to you by MyChart, letter or phone within 4 business days after the clinic has received the results. If you do not hear from us within 7 days, please contact the clinic through MyChart or phone. If you have a critical or abnormal lab result, we will notify you by phone as  "soon as possible.  Submit refill requests through [employer] or call your pharmacy and they will forward the refill request to us. Please allow 3 business days for your refill to be completed.          Additional Information About Your Visit        [facility] Information     [employer] gives you secure access to your electronic health record. If you see a primary care provider, you can also send messages to your care team and make appointments. If you have questions, please call your primary care clinic.  If you do not have a primary care provider, please call [phone] and they will assist you.        Care EveryWhere ID     This is your Care EveryWhere ID. This could be used by other organizations to access your [place] medical records  [id number]        Your Vitals Were     Pulse Temperature Height BMI (Body Mass Index) Pulse Oximetry       89 98.6  F (37  C) (Tympanic) 5' 6\" (1.676 m) 35.36 kg/m2 97%        Blood Pressure from Last 3 Encounters:   02/08/17 138/72   02/01/17 154/61   01/24/17 150/82    Weight from Last 3 Encounters:   02/08/17 219 lb (99.338 kg)   02/01/17 212 lb (96.163 kg)   01/24/17 215 lb 7 oz (97.722 kg)              Today, you had the following     No orders found for display         Today's Medication Changes          These changes are accurate as of: 2/8/17  1:41 PM.  If you have any questions, ask your nurse or doctor.               These medicines have changed or have updated prescriptions.        Dose/Directions    rOPINIRole 1 MG tablet   Commonly known as:  REQUIP   This may have changed:  Another medication with the same name was removed. Continue taking this medication, and follow the directions you see here.   Used for:  Restless legs syndrome (RLS)   Changed by:  Tegan Bowden MD        Dose:  1 mg   Take 1 tablet (1 mg) by mouth 3 times daily   Quantity:  270 tablet   Refills:  3                Primary Care Provider Office Phone # Fax #    Tegan Bowden MD " [phone]       [facility] [address] [place] [address]        Thank you!     Thank you for choosing [facility]  for your care. Our goal is always to provide you with excellent care. Hearing back from our patients is one way we can continue to improve our services. Please take a few minutes to complete the written survey that you may receive in the mail after your visit with us. Thank you!             Your Updated Medication List - Protect others around you: Learn how to safely use, store and throw away your medicines at www.disposemymeds.org.          This list is accurate as of: 2/8/17  1:41 PM.  Always use your most recent med list.                   Brand Name Dispense Instructions for use    amLODIPine 5 MG tablet    NORVASC    90 tablet    Take 1 tablet (5 mg) by mouth daily       blood glucose monitoring lancets     1 Box    1 each 2 times daily       blood glucose monitoring test strip    ACCU-CHEK SMARTVIEW    1 Box    Test 1 time a day.       CALCIUM COMPLEX OR      Take by mouth daily 2 tablets daily       clonazePAM 0.5 MG tablet    klonoPIN    30 tablet    Take 0.5 tablets (0.25 mg) by mouth See Admin Instructions One-half po q hs.  May repeat dose in one hour if RLS persist.       losartan 100 MG tablet    COZAAR    90 tablet    Take 1 tablet (100 mg) by mouth daily       magnesium gluconate 500 (27 MG) MG tablet    MAGONATE     Take 1 tablet (500 mg) by mouth 2 times daily       meclizine 25 MG tablet    ANTIVERT    90 tablet    Take 1 tablet (25 mg) by mouth 3 times daily as needed for dizziness       * order for DME     1 Units    Equipment being ordered: TENS       * order for DME     1 Device    Equipment being ordered: SADD lights-10,000 Lux       * order for DME     1 each    Equipment being ordered: Wrist splint       PROBIOTIC PO      Take 1 capsule by mouth daily as needed As needed       rOPINIRole 1 MG tablet    REQUIP    270 tablet    Take 1  tablet (1 mg) by mouth 3 times daily       * STATIN NOT PRESCRIBED (INTENTIONAL)     0 each    Statin not prescribed intentionally due to Intolerance       UNKNOWN MED DOSAGE      Mobysil cream-1 application prn joint pain       VITAMIN B-12 PO          vitamin D 2000 UNITS Caps     30 capsule    Take 2 tablets by mouth daily       zolpidem 5 MG tablet    AMBIEN    30 tablet    Take 1 tablet (5 mg) by mouth nightly as needed for sleep       * Notice:  This list has 4 medication(s) that are the same as other medications prescribed for you. Read the directions carefully, and ask your doctor or other care provider to review them with you.

## 2017-02-08 NOTE — PROGRESS NOTES
SUBJECTIVE:                                                    Rosemarie Dolan is a 71 year old female who presents to clinic today for the following health issues:        Chronic Pain Follow-Up       Type / Location of Pain: fibromyalgia  Analgesia/pain control:       Recent changes:  improved      Overall control: Comfortably manageable once in awhile tolerable  Activity level/function:      Daily activities:  Can do most things most days, with some rest    Work:  not applicable  Adverse effects:  Yes difficulty with sleeping, pt can not do too much in a day  Adherance    Taking medication as directed?  Yes    Participating in other treatments: yes  Risk Factors:    Sleep:  Fair    Mood/anxiety:  controlled    Recent family or social stressors:  Things have improved    Other aggravating factors: prolonged standing  PHQ-9 SCORE 11/8/2016 12/21/2016 1/25/2017   Total Score - - -   Total Score - - -   Total Score 7 6 13     NATE-7 SCORE 2/6/2015 3/20/2015 12/21/2016   Total Score 1 1 -   Total Score - - 3     Encounter-Level CSA:     There are no encounter-level csa.             Amount of exercise or physical activity: None    Problems taking medications regularly: No    Medication side effects: muscle aches    Diet: regular (no restrictions)    She feels that things are better.   is better.  She did see neurologist who said he didn't think there was much that he could do.  He recommended trial of clonazepam and coming off the requip/weaning down.  He recommended that she not take the cymbalta due to the possible increase in RLS symptoms.  He also stopped nortriptyline for the same reason.  She notes that she slept a lot the first day she took clonazepam.  Last night she noticed some RLS symptoms, she feels she cannot lay down for very long due to her back.  She thinks this is worsening with the cold.  She notes she is sleeping much better.  She notes she is taking 1/2 of a 0.5mg pill and then taking the  "other 1/2 if it doesn't work. She notes she is taking the other 1/2 after 1-1.5 hrs and then will sleep the rest of the night. Is still taking 1mg requip 3x daily.  Is not taking extra doses.   Has not been taking ambien with the clonazepam.      She thinks the incontinence is getting worse.  She notes that she is overflowing pads.      She has noted more stomach pain off the nortriptyline, had some reflux yesterday and some stomach pain after eating.  She notes that she is drinking a coke which makes her burp and makes her stomach stop hurting.     She notes the palpitations resolved after she had a night of sleeping well.  Did start a little bit with some anxiety, but then resolved again on its own.  She notes sensitivity to light is better.  She notes anxiety in general is better as  is doing better and there is less pressure on her.  She does note that she was able to start quilting again.         Problem list and histories reviewed & adjusted, as indicated.  Additional history: as documented    Problem list, Medication list, Allergies, and Medical/Social/Surgical histories reviewed in [facility] and updated as appropriate.    ROS:  Constitutional, HEENT, cardiovascular, pulmonary, gi and gu systems are negative, except as otherwise noted.    OBJECTIVE:                                                    /72 mmHg  Pulse 89  Temp(Src) 98.6  F (37  C) (Tympanic)  Ht 5' 6\" (1.676 m)  Wt 219 lb (99.338 kg)  BMI 35.36 kg/m2  SpO2 97%  Body mass index is 35.36 kg/(m^2).  GENERAL: , alert and no distress  RESP: lungs clear to auscultation - no rales, rhonchi or wheezes  CV: regular rate and rhythm, normal S1 S2, no S3 or S4, no murmur, click or rub, no peripheral edema and peripheral pulses strong  MS: no gross musculoskeletal defects noted, no edema    Diagnostic Test Results:  none      ASSESSMENT/PLAN:                                                    (G25.81) Restless legs syndrome (RLS)  (primary " encounter diagnosis)  -doing better with symptoms on both requip and clonazepam  -I asked her not to take the clonazepam with the ambien  -it appears plan is to wean her off the requip  -considering her history of narcotic use I am concerned about possible overuse of benzo's, will need to monitor     (R00.2) Palpitations  -holter monitor is negative, labs normal  -likely related to anxiety, no change to medications    (M79.7) Fibromyalgia  -pt does not tolerate any medications for fibromyalgia at this point  -if she has further flare ups/worsening would need to be seen by rheumatology    (F41.9) Anxiety  -appears better today  -on benzo which may be helping with daytime anxiety symptoms     (N81.10) Bladder prolapse, female, acquired  -will need to see urology if symptoms are worsening    FUTURE APPOINTMENTS:       - Follow-up for diabetes follow up    Tegan Bowden MD  [facility]

## 2017-02-08 NOTE — NURSING NOTE
"Chief Complaint   Patient presents with     RECHECK     f/u on palpitations and fibro       Initial /72 mmHg  Pulse 89  Temp(Src) 98.6  F (37  C) (Tympanic)  Ht 5' 6\" (1.676 m)  Wt 219 lb (99.338 kg)  BMI 35.36 kg/m2  SpO2 97% Estimated body mass index is 35.36 kg/(m^2) as calculated from the following:    Height as of this encounter: 5' 6\" (1.676 m).    Weight as of this encounter: 219 lb (99.338 kg).  Medication Reconciliation: complete   Lorrie MACIEJ Hatfield      "

## 2017-03-26 NOTE — ED PROVIDER NOTES
History     Chief Complaint:  Urinary Retention      HPI   Rosemarie Dolan is a 72 year old female, with a history of renal cell carcinoma and bladder prolapse, S/P right partial nephrectomy, who presents with urinary retention. The patient reports the onset of upper abdominal cramping 2 days ago with associated vomiting and diarrhea. These symptoms resolved, but she then developed sharp lower abdominal and pelvic pain yesterday with urinary retention and urgency. She reports that she only voids a small amount of urine at a time and then feels the urge to go very soon after. She reports low back pain as well, but notes that she frequently has back pain like this. The patient was seen at urgent care today and was referred here after UTI was ruled out for concern for more worrisome etiology. The patient reports that she had a partial nephrectomy secondary to cancer and has yearly check ups. Last year, she was told that she had a prolapsed bladder and that she was not fully emptying her bladder. She was told to cath herself daily, but she had great difficulty doing this. She ended up getting a bad UTI and was catheterized for a few days. This was then removed and she has not had problems since. Today, she reports that she thinks the same thing is happening and that she is not emptying her bladder fully. She denies fevers. She reports that she has neuropathy and RLS in her legs, but this is at baseline and she denies new pain, numbness, or loss in range of motion.     UA from Urgent Care 3/26/17:  Clear, yellow urine; All WNL     Allergies:  Gluten: AP and BP  Lyrica: palpitations  Naproxen: nausea and vomiting  Penicillins: rash  Sulfa drugs: anaphylactic shock    Medications:    Amlodipine  Losartan  Requip  Ambien  Meclizine  Clonazepam     Past Medical History:    Sleep apnea  Renal cell carcinoma  Osteoarthrosis  IBS  Intramural leiomyoma of uterus  HLD  Fibromyalgia  HTN  Esophageal reflux  DM  Depressive  "disorder  Heart murmur    Past Surgical History:    Breast biopsy  D & C  Bilateral knee surgeries  Nephrectomy, partial, right    Family History:    History reviewed. No pertinent family history.      Social History:  Marital status:   Never smoker, negative for alcohol use.  The patient presents alone.    Review of Systems   Constitutional: Negative for fever.   Gastrointestinal: Positive for abdominal pain, diarrhea (resolved) and vomiting (resolved).   Genitourinary: Positive for urgency.   Musculoskeletal: Positive for back pain.   Neurological: Negative for weakness and numbness.   All other systems reviewed and are negative.    Physical Exam     Patient Vitals for the past 24 hrs:   BP Temp Temp src Pulse Heart Rate Resp SpO2 Height Weight   03/26/17 1430 128/71 - - - - - 93 % - -   03/26/17 1429 - - - - - - 93 % - -   03/26/17 1428 - - - - - - 93 % - -   03/26/17 1415 135/73 - - - - - - - -   03/26/17 1400 139/70 - - - - - - - -   03/26/17 1346 - - - - - 18 - - -   03/26/17 1345 136/69 - - - - - 92 % - -   03/26/17 1344 - - - - - - 93 % - -   03/26/17 1343 - - - - - - 94 % - -   03/26/17 1342 143/71 - - - - - - - -   03/26/17 1315 - - - - - - 94 % - -   03/26/17 1307 - - - - - 18 - - -   03/26/17 1300 149/72 - - - - - 97 % - -   03/26/17 1253 - - - - - - 95 % - -   03/26/17 1252 - - - - - - 96 % - -   03/26/17 1251 143/75 - - - - - - - -   03/26/17 1200 (!) 188/100 96.9  F (36.1  C) Oral 86 86 18 99 % 1.676 m (5' 6\") 95.3 kg (210 lb)      Physical Exam   Constitutional: She is cooperative.   HENT:   Right Ear: Tympanic membrane normal.   Left Ear: Tympanic membrane normal.   Mouth/Throat: Oropharynx is clear and moist and mucous membranes are normal.   Eyes: Conjunctivae are normal.   Neck: Normal range of motion.   Cardiovascular: Regular rhythm and normal heart sounds.    Pulmonary/Chest: Effort normal and breath sounds normal.   Abdominal: Soft. Normal appearance and bowel sounds are normal. There " is no rebound and no guarding.   Genitourinary:   Genitourinary Comments: Urethrocele present. No gross prolapse.    Musculoskeletal: Normal range of motion.   Lymphadenopathy:     She has no cervical adenopathy.   Neurological: She is alert.   Skin: Skin is warm and dry.   Psychiatric: She has a normal mood and affect.         Emergency Department Course     Imaging:  Radiographic findings were communicated with the patient who voiced understanding of the findings.    CT Abdomen and Pelvis, with contrast, as per radiology:   1. Nonaneurysmal atherosclerosis.  2. Probable left renal cyst in the left lateral mid cortex.  3. The small bowel is filled with fluid which could be due to recent ingestion of fluid but could also represent enteritis. No abnormal dilatation of bowel to suggest bowel obstruction.  4. Irregular hypoattenuation in the central aspect of the uterus could represent fluid within the endometrial canal, degenerating fibroid, or other lesion. This appears slightly increased in size since the prior CT dated 3/8/2014. Prior pelvic ultrasound dated 3/8/2014 demonstrated an irregular fluid collection grossly in this region and I do recommend repeat pelvic ultrasound to ensure stability of that  finding.  5. No etiology for patient's abdominal pain is otherwise seen. No evidence for diverticulitis or appendicitis is seen.    Laboratory:  CBC: WBC 6.9 (WNL) HGB 11.7 (WNL)  (WNL)  CMP: Creatinine 0.73 (WNL) Glucose 97 (WNL) Rest WNL      Interventions:  1307: Normal Saline, 1000 mL, IV injection   1307: Dilaudid, 0.5 mg, IV injection   1323: Normal Saline, 1000 mL, IV injection     ED Course:  Nursing notes and vitals reviewed.  I performed an exam of the patient as documented above.     1415: I checked in with and updated the patient. We discussed plan and she is ready for discharge.    I personally reviewed the laboratory and imaging results with the patient and answered all related questions prior  to discharge.   Findings and plan explained to the patient. Patient discharged home with instructions regarding supportive care, medications, and reasons to return. The importance of close follow-up was reviewed.     Impression & Plan      Medical Decision Making:  Rosemarie Dolan is a 72 year old female referred to the ER from clinic for further evaluation of abdominal pain and feeling of incomplete voiding. She has a history of urethrocele and no neurologic symptoms to suggest cauda equina syndrome or spinal cord compression. Here she did have a post void residual of about 150 mL. Arana catheter inserted, but this did not result in any significant improvement in her discomfort. We pursued other abdominal diagnoses given her symptoms. CT does not show any evidence of an acute inflammatory condition within the abdomen. They do note fluid filled loops of small bowel which could represent enteritis. Also noted was an area of hypoattenuation within the uterus. If she does have endometrial fluid, that could be increasing her feeling of prolapse and will require close follow up. After one dose of dilaudid here, patient reports that her pain has completely resolved and she is comfortable with discharge home. I have discharged her with Percocet as needed, follow up with OB/GYN this week, abdominal pain instructions, return if worse or new symptoms.    Diagnosis:    ICD-10-CM    1. Abdominal pain, unspecified location R10.9    2. Urethrocele N36.8      Disposition:   Discharge to home with primary care follow up.     Discharge Medications:   New Prescriptions    OXYCODONE-ACETAMINOPHEN (PERCOCET) 5-325 MG PER TABLET    Take 1-2 tablets by mouth every 4 hours as needed for pain     Santa CORTES, am serving as a scribe on 3/26/2017 at 12:06 PM to personally document services performed by Ivania Browne MD, based on my observations and the provider's statements to me.           Ivania Browne MD  03/26/17  [id number]

## 2017-03-26 NOTE — ED NOTES
Patient is referred to the ED from Urgent Care with c/o of back pain and urinary retention.  Patient states that she is negative for a UTI.  Patient rates her pain a 6 on a 1-10 scale.  ABCs intact.

## 2017-03-26 NOTE — ED AVS SNAPSHOT
[facility] Emergency Department    [address] [place] [address]    Phone:  [phone]    Fax:  [phone]                                       Rosemarie Dolan   MRN: [id number]    Department:  [facility] Emergency Department   Date of Visit:  3/26/2017           Patient Information     Date Of Birth          1945        Your diagnoses for this visit were:     None       You were seen by Ivania Browne MD.      Follow-up Information     Follow up with Tegan Bowden MD In 2 days.    Specialty:  Pediatrics    Contact information:    [facility] [place] [id number] [facility] [address] [place] [address]  [phone]          Discharge Instructions       Discharge Instructions  Abdominal Pain    Abdominal pain can be caused by many things. Your evaluation today does not show the exact cause for your pain. Your doctor today has decided that it is unlikely your pain is due to a life threatening problem, or a problem requiring surgery or hospital admission. Sometimes those problems cannot be found right away, so it is very important that you follow up as directed.  Sometimes only the changes which occur over time allow the cause of your pain to be found.     If your pain gets worse, changes in location, or feels different, return to the Emergency Department right away.    ADULTS:  Return to the Emergency Department right away if:      You get an oral temperature above 102oF or as directed by your doctor.    You have blood in your stools (bright red or black, tarry stools).    You keep throwing up or can t drink liquids.    You see blood when you throw up.    You can t have a bowel movement or you can t pass gas.    Your stomach gets bloated or bigger.    Your skin or the whites of your eyes look yellow.    You faint.    You have bloody, frequent or painful urination.    You have new symptoms or anything that worries you.    CHILDREN:  Return to the  Emergency Department right away if your child has any of the above-listed symptoms or the following:      Pushes your hand away or screams/cries when his/her belly is touched.    You notice your child is very fussy or weak.    Your child is very tired and is too tired to eat or drink.    Your child is dehydrated.  Signs of dehydration can be:  o Your infant has had no wet diapers in 4-5 hours.  o Your older child has not passed urine in 6-8 hours.  o Your infant or child starts to have dry mouth and lips, or no saliva or tears.    PREGNANT WOMEN:  Return to the Emergency Department right away if you have any of the above-listed symptoms or the following:      You have bleeding, leaking fluid or passing tissue from the vagina.    You have worse pain or cramping, or pain in your shoulder or back.    You have vomiting that will not stop.    You have painful or bloody urination.    You have a temperature of 100oF or more.    Your baby is not moving as much as usual.    You faint.    You get a bad headache with or without eye problems and abdominal pain.    You have a convulsion or seizure.    You have unusual discharge from your vagina and abdominal pain.    Abdominal pain is pretty common during pregnancy.  Your pain may or may not be related to your pregnancy. You should follow-up closely with your OB doctor so they can evaluate you and your baby.  Until you follow-up with your regular doctor, do the following:       Avoid sex and do not put anything in your vagina.    Drink clear fluids.    Only take medications approved by your doctor.    MORE INFORMATION:    Appendicitis:  A possible cause of abdominal pain in any person who still has their appendix is acute appendicitis. Appendicitis is often hard to diagnose.  Testing does not always rule out early appendicitis or other causes of abdominal pain. Close follow-up with your doctor and re-evaluations may be needed to figure out the reason for your abdominal  "pain.    Follow-up:  It is very important that you make an appointment with your clinic and go to the appointment.  If you do not follow-up with your primary doctor, it may result in missing an important development which could result in permanent injury or disability and/or lasting pain.  If there is any problem keeping your appointment, call your doctor or return to the Emergency Department.    Medications:  Take your medications as directed by your doctor today.  Before using over-the-counter medications, ask your doctor and make sure to take the medications as directed.  If you have any questions about medications, ask your doctor.    Diet:  Resume your normal diet as much as possible, but do not eat fried, fatty or spicy foods while you have pain.  Do not drink alcohol or have caffeine.  Do not smoke tobacco.    Probiotics: If you have been given an antibiotic, you may want to also take a probiotic pill or eat yogurt with live cultures. Probiotics have \"good bacteria\" to help your intestines stay healthy. Studies have shown that probiotics help prevent diarrhea and other intestine problems (including C. diff infection) when you take antibiotics. You can buy these without a prescription in the pharmacy section of the store.     If you were given a prescription for medicine here today, be sure to read all of the information (including the package insert) that comes with your prescription.  This will include important information about the medicine, its side effects, and any warnings that you need to know about.  The pharmacist who fills the prescription can provide more information and answer questions you may have about the medicine.  If you have questions or concerns that the pharmacist cannot address, please call or return to the Emergency Department.         Opioid Medication Information    Pain medications are among the most commonly prescribed medicines, so we are including this information for all our " patients. If you did not receive pain medication or get a prescription for pain medicine, you can ignore it.     You may have been given a prescription for an opioid (narcotic) pain medicine and/or have received a pain medicine while here in the Emergency Department. These medicines can make you drowsy or impaired. You must not drive, operate dangerous equipment, or engage in any other dangerous activities while taking these medications. If you drive while taking these medications, you could be arrested for DUI, or driving under the influence. Do not drink any alcohol while you are taking these medications.     Opioid pain medications can cause addiction. If you have a history of chemical dependency of any type, you are at a higher risk of becoming addicted to pain medications.  Only take these prescribed medications to treat your pain when all other options have been tried. Take it for as short a time and as few doses as possible. Store your pain pills in a secure place, as they are frequently stolen and provide a dangerous opportunity for children or visitors in your house to start abusing these powerful medications. We will not replace any lost or stolen medicine.  As soon as your pain is better, you should flush all your remaining medication.     Many prescription pain medications contain Tylenol  (acetaminophen), including Vicodin , Tylenol #3 , Norco , Lortab , and Percocet .  You should not take any extra pills of Tylenol  if you are using these prescription medications or you can get very sick.  Do not ever take more than 3000 mg of acetaminophen in any 24 hour period.    All opioids tend to cause constipation. Drink plenty of water and eat foods that have a lot of fiber, such as fruits, vegetables, prune juice, apple juice and high fiber cereal.  Take a laxative if you don t move your bowels at least every other day. Miralax , Milk of Magnesia, Colace , or Senna  can be used to keep you regular.       Remember that you can always come back to the Emergency Department if you are not able to see your regular doctor in the amount of time listed above, if you get any new symptoms, or if there is anything that worries you.        Future Appointments        Provider Department Dept Phone Center    4/14/2017 9:40 AM Von Brizuela MD [facility] Neurology [phone] [facility]    5/9/2017 9:00 AM Tegan Bowden MD [facility] [phone] [facility]    11/30/2017 10:00 AM Aldo Longoria MD, MD [facility] [phone] [facility]      24 Hour Appointment Hotline       To make an appointment at any [facility] call [phone] ([phone]). If you don't have a family doctor or clinic, we will help you find one. [place] clinics are conveniently located to serve the needs of you and your family.             Review of your medicines      START taking        Dose / Directions Last dose taken    oxyCODONE-acetaminophen 5-325 MG per tablet   Commonly known as:  PERCOCET   Dose:  1-2 tablet   Quantity:  15 tablet        Take 1-2 tablets by mouth every 4 hours as needed for pain   Refills:  0          Our records show that you are taking the medicines listed below. If these are incorrect, please call your family doctor or clinic.        Dose / Directions Last dose taken    amLODIPine 5 MG tablet   Commonly known as:  NORVASC   Dose:  5 mg   Quantity:  90 tablet        Take 1 tablet (5 mg) by mouth daily   Refills:  3        blood glucose monitoring lancets   Dose:  1 each   Quantity:  1 Box        1 each 2 times daily   Refills:  11        blood glucose monitoring test strip   Commonly known as:  ACCU-CHEK SMARTVIEW   Quantity:  1 Box        Test 1 time a day.   Refills:  11        CALCIUM COMPLEX OR        Take by mouth daily 2 tablets daily   Refills:  0        clonazePAM 0.5 MG tablet   Commonly known as:  klonoPIN   Dose:  0.25 mg   Quantity:  30 tablet        Take 0.5  tablets (0.25 mg) by mouth See Admin Instructions One-half po q hs.  May repeat dose in one hour if RLS persist.   Refills:  0        losartan 100 MG tablet   Commonly known as:  COZAAR   Dose:  100 mg   Quantity:  90 tablet        Take 1 tablet (100 mg) by mouth daily   Refills:  3        magnesium gluconate 500 (27 MG) MG tablet   Commonly known as:  MAGONATE   Dose:  500 mg        Take 1 tablet (500 mg) by mouth 2 times daily   Refills:  0        meclizine 25 MG tablet   Commonly known as:  ANTIVERT   Dose:  25 mg   Quantity:  90 tablet        Take 1 tablet (25 mg) by mouth 3 times daily as needed for dizziness   Refills:  3        * order for DME   Quantity:  1 Units        Equipment being ordered: TENS   Refills:  0        * order for DME   Quantity:  1 Device        Equipment being ordered: SADD lights-10,000 Lux   Refills:  0        * order for DME   Quantity:  1 each        Equipment being ordered: Wrist splint   Refills:  0        PROBIOTIC PO   Dose:  1 capsule        Take 1 capsule by mouth daily as needed As needed   Refills:  0        rOPINIRole 1 MG tablet   Commonly known as:  REQUIP   Dose:  1 mg   Quantity:  270 tablet        Take 1 tablet (1 mg) by mouth 3 times daily   Refills:  3        * STATIN NOT PRESCRIBED (INTENTIONAL)   Quantity:  0 each        Statin not prescribed intentionally due to Intolerance   Refills:  0        UNKNOWN MED DOSAGE        Mobysil cream-1 application prn joint pain   Refills:  0        VITAMIN B-12 PO        Refills:  0        vitamin D 2000 UNITS Caps   Dose:  2 tablet   Quantity:  30 capsule        Take 2 tablets by mouth daily   Refills:  0        zolpidem 5 MG tablet   Commonly known as:  AMBIEN   Dose:  5 mg   Quantity:  30 tablet        Take 1 tablet (5 mg) by mouth nightly as needed for sleep   Refills:  5        * Notice:  This list has 4 medication(s) that are the same as other medications prescribed for you. Read the directions carefully, and ask your doctor  or other care provider to review them with you.            Prescriptions were sent or printed at these locations (1 Prescription)                   Other Prescriptions                Printed at Department/Unit printer (1 of 1)         oxyCODONE-acetaminophen (PERCOCET) 5-325 MG per tablet                Procedures and tests performed during your visit     CBC with platelets differential    CT Abdomen Pelvis w Contrast    Comprehensive metabolic panel      Orders Needing Specimen Collection     None      Pending Results     Date and Time Order Name Status Description    3/26/2017 1300 CT Abdomen Pelvis w Contrast Preliminary             Pending Culture Results     No orders found from 3/24/2017 to 3/27/2017.             Test Results from your hospital stay     3/26/2017 12:59 PM - Interface, Flexilab Results      Component Results     Component Value Ref Range & Units Status    WBC 6.9 4.0 - 11.0 10e9/L Final    RBC Count 3.84 3.8 - 5.2 10e12/L Final    Hemoglobin 11.7 11.7 - 15.7 g/dL Final    Hematocrit 35.4 35.0 - 47.0 % Final    MCV 92 78 - 100 fl Final    MCH 30.5 26.5 - 33.0 pg Final    MCHC 33.1 31.5 - 36.5 g/dL Final    RDW 13.2 10.0 - 15.0 % Final    Platelet Count 189 150 - 450 10e9/L Final    Diff Method Automated Method  Final    % Neutrophils 64.5 % Final    % Lymphocytes 23.4 % Final    % Monocytes 9.2 % Final    % Eosinophils 2.3 % Final    % Basophils 0.3 % Final    % Immature Granulocytes 0.3 % Final    Nucleated RBCs 0 0 /100 Final    Absolute Neutrophil 4.4 1.6 - 8.3 10e9/L Final    Absolute Lymphocytes 1.6 0.8 - 5.3 10e9/L Final    Absolute Monocytes 0.6 0.0 - 1.3 10e9/L Final    Absolute Eosinophils 0.2 0.0 - 0.7 10e9/L Final    Absolute Basophils 0.0 0.0 - 0.2 10e9/L Final    Abs Immature Granulocytes 0.0 0 - 0.4 10e9/L Final    Absolute Nucleated RBC 0.0  Final         3/26/2017  1:16 PM - Interface, Flexilab Results      Component Results     Component Value Ref Range & Units Status    Sodium  142 133 - 144 mmol/L Final    Potassium 4.0 3.4 - 5.3 mmol/L Final    Chloride 107 94 - 109 mmol/L Final    Carbon Dioxide 26 20 - 32 mmol/L Final    Anion Gap 9 3 - 14 mmol/L Final    Glucose 97 70 - 99 mg/dL Final    Urea Nitrogen 17 7 - 30 mg/dL Final    Creatinine 0.73 0.52 - 1.04 mg/dL Final    GFR Estimate 78 >60 mL/min/1.7m2 Final    Non  GFR Calc    GFR Estimate If Black >90   GFR Calc   >60 mL/min/1.7m2 Final    Calcium 8.5 8.5 - 10.1 mg/dL Final    Bilirubin Total 0.4 0.2 - 1.3 mg/dL Final    Albumin 3.5 3.4 - 5.0 g/dL Final    Protein Total 7.2 6.8 - 8.8 g/dL Final    Alkaline Phosphatase 90 40 - 150 U/L Final    ALT 24 0 - 50 U/L Final    AST 17 0 - 45 U/L Final         3/26/2017  2:04 PM - Interface, Radiant Ib      Narrative     CT ABDOMEN/PELVIS WITH CONTRAST March 26, 2017 1:40 PM    HISTORY: Abdominal pain.    TECHNIQUE: Scans obtained from the diaphragm through the pelvis with  oral and IV contrast, 100mL Isovue-370. Radiation dose for this scan  was reduced using automated exposure control, adjustment of the mA  and/or kV according to patient size, or iterative reconstruction  technique.    COMPARISON: Pelvic ultrasound dated 3/8/2014 and CT abdomen and pelvis  dated 3/8/2014.    FINDINGS: Minimal atelectasis versus scarring is seen in the right  lung base. There are aortic calcifications. Visualized portions of the  lung bases and mediastinal contents are otherwise unremarkable. There  are no aggressive osseous lesions. Degenerative changes are noted in  the spine.    Tiny low-attenuation lesion lateral cortex of the mid to upper left  kidney (image 27 series 2) measures up to 0.9 cm and is too small to  characterize but likely represents a cyst. The liver, gallbladder,  pancreas, spleen, bilateral adrenal glands, and bilateral kidneys  otherwise enhance normally. No hydronephrosis, nephrolithiasis,  hydroureter, or ureteral calculus is identified. Urinary bladder  is  decompressed around a Arana catheter but is otherwise unremarkable.    There is irregularity of the uterus with areas of hypodensity  centrally which could represent abnormal endometrium and could also  represent a necrotic fibroid within the uterus. A dystrophic  calcification is seen in the right side of the uterus. Ovaries are  grossly unremarkable.    No adenopathy, free fluid, or free air is identified. There is  nonaneurysmal atherosclerosis.    The colon is of normal caliber without pericolonic inflammatory change  to suggest acute diverticulitis. A moderate amount stool is seen in  the colon. A structure which could represent normal appendix extends  from the cecum. No pericecal inflammatory change to suggest acute  appendicitis. Small bowel is grossly of normal caliber and is filled  with fluid. The stomach is decompressed but otherwise unremarkable.        Impression     IMPRESSION:  1. Nonaneurysmal atherosclerosis.  2. Probable left renal cyst in the left lateral mid cortex.  3. The small bowel is filled with fluid which could be due to recent  ingestion of fluid but could also represent enteritis. No abnormal  dilatation of bowel to suggest bowel obstruction.  4. Irregular hypoattenuation in the central aspect of the uterus could  represent fluid within the endometrial canal, degenerating fibroid, or  other lesion. This appears slightly increased in size since the prior  CT dated 3/8/2014. Prior pelvic ultrasound dated 3/8/2014 demonstrated  an irregular fluid collection grossly in this region and I do  recommend repeat pelvic ultrasound to ensure stability of that  finding.  5. No etiology for patient's abdominal pain is otherwise seen. No  evidence for diverticulitis or appendicitis is seen.                Clinical Quality Measure: Blood Pressure Screening     Your blood pressure was checked while you were in the emergency department today. The last reading we obtained was  BP: 136/69 . Please  read the guidelines below about what these numbers mean and what you should do about them.  If your systolic blood pressure (the top number) is less than 120 and your diastolic blood pressure (the bottom number) is less than 80, then your blood pressure is normal. There is nothing more that you need to do about it.  If your systolic blood pressure (the top number) is 120-139 or your diastolic blood pressure (the bottom number) is 80-89, your blood pressure may be higher than it should be. You should have your blood pressure rechecked within a year by a primary care provider.  If your systolic blood pressure (the top number) is 140 or greater or your diastolic blood pressure (the bottom number) is 90 or greater, you may have high blood pressure. High blood pressure is treatable, but if left untreated over time it can put you at risk for heart attack, stroke, or kidney failure. You should have your blood pressure rechecked by a primary care provider within the next 4 weeks.  If your provider in the emergency department today gave you specific instructions to follow-up with your doctor or provider even sooner than that, you should follow that instruction and not wait for up to 4 weeks for your follow-up visit.        Thank you for choosing [facility]       Thank you for choosing [facility] for your care. Our goal is always to provide you with excellent care. Hearing back from our patients is one way we can continue to improve our services. Please take a few minutes to complete the written survey that you may receive in the mail after you visit with us. Thank you!        [facility] Information     [facility] gives you secure access to your electronic health record. If you see a primary care provider, you can also send messages to your care team and make appointments. If you have questions, please call your primary care clinic.  If you do not have a primary care provider, please call [phone] and they will assist you.         Care EveryWhere ID     This is your Care EveryWhere ID. This could be used by other organizations to access your [facility] medical records  [id number]        After Visit Summary       This is your record. Keep this with you and show to your community pharmacist(s) and doctor(s) at your next visit.

## 2017-03-26 NOTE — DISCHARGE INSTRUCTIONS
Discharge Instructions  Abdominal Pain    Abdominal pain can be caused by many things. Your evaluation today does not show the exact cause for your pain. Your doctor today has decided that it is unlikely your pain is due to a life threatening problem, or a problem requiring surgery or hospital admission. Sometimes those problems cannot be found right away, so it is very important that you follow up as directed.  Sometimes only the changes which occur over time allow the cause of your pain to be found.     If your pain gets worse, changes in location, or feels different, return to the Emergency Department right away.    ADULTS:  Return to the Emergency Department right away if:      You get an oral temperature above 102oF or as directed by your doctor.    You have blood in your stools (bright red or black, tarry stools).    You keep throwing up or can t drink liquids.    You see blood when you throw up.    You can t have a bowel movement or you can t pass gas.    Your stomach gets bloated or bigger.    Your skin or the whites of your eyes look yellow.    You faint.    You have bloody, frequent or painful urination.    You have new symptoms or anything that worries you.    CHILDREN:  Return to the Emergency Department right away if your child has any of the above-listed symptoms or the following:      Pushes your hand away or screams/cries when his/her belly is touched.    You notice your child is very fussy or weak.    Your child is very tired and is too tired to eat or drink.    Your child is dehydrated.  Signs of dehydration can be:  o Your infant has had no wet diapers in 4-5 hours.  o Your older child has not passed urine in 6-8 hours.  o Your infant or child starts to have dry mouth and lips, or no saliva or tears.    PREGNANT WOMEN:  Return to the Emergency Department right away if you have any of the above-listed symptoms or the following:      You have bleeding, leaking fluid or passing tissue from the  vagina.    You have worse pain or cramping, or pain in your shoulder or back.    You have vomiting that will not stop.    You have painful or bloody urination.    You have a temperature of 100oF or more.    Your baby is not moving as much as usual.    You faint.    You get a bad headache with or without eye problems and abdominal pain.    You have a convulsion or seizure.    You have unusual discharge from your vagina and abdominal pain.    Abdominal pain is pretty common during pregnancy.  Your pain may or may not be related to your pregnancy. You should follow-up closely with your OB doctor so they can evaluate you and your baby.  Until you follow-up with your regular doctor, do the following:       Avoid sex and do not put anything in your vagina.    Drink clear fluids.    Only take medications approved by your doctor.    MORE INFORMATION:    Appendicitis:  A possible cause of abdominal pain in any person who still has their appendix is acute appendicitis. Appendicitis is often hard to diagnose.  Testing does not always rule out early appendicitis or other causes of abdominal pain. Close follow-up with your doctor and re-evaluations may be needed to figure out the reason for your abdominal pain.    Follow-up:  It is very important that you make an appointment with your clinic and go to the appointment.  If you do not follow-up with your primary doctor, it may result in missing an important development which could result in permanent injury or disability and/or lasting pain.  If there is any problem keeping your appointment, call your doctor or return to the Emergency Department.    Medications:  Take your medications as directed by your doctor today.  Before using over-the-counter medications, ask your doctor and make sure to take the medications as directed.  If you have any questions about medications, ask your doctor.    Diet:  Resume your normal diet as much as possible, but do not eat fried, fatty or spicy  "foods while you have pain.  Do not drink alcohol or have caffeine.  Do not smoke tobacco.    Probiotics: If you have been given an antibiotic, you may want to also take a probiotic pill or eat yogurt with live cultures. Probiotics have \"good bacteria\" to help your intestines stay healthy. Studies have shown that probiotics help prevent diarrhea and other intestine problems (including C. diff infection) when you take antibiotics. You can buy these without a prescription in the pharmacy section of the store.     If you were given a prescription for medicine here today, be sure to read all of the information (including the package insert) that comes with your prescription.  This will include important information about the medicine, its side effects, and any warnings that you need to know about.  The pharmacist who fills the prescription can provide more information and answer questions you may have about the medicine.  If you have questions or concerns that the pharmacist cannot address, please call or return to the Emergency Department.         Opioid Medication Information    Pain medications are among the most commonly prescribed medicines, so we are including this information for all our patients. If you did not receive pain medication or get a prescription for pain medicine, you can ignore it.     You may have been given a prescription for an opioid (narcotic) pain medicine and/or have received a pain medicine while here in the Emergency Department. These medicines can make you drowsy or impaired. You must not drive, operate dangerous equipment, or engage in any other dangerous activities while taking these medications. If you drive while taking these medications, you could be arrested for DUI, or driving under the influence. Do not drink any alcohol while you are taking these medications.     Opioid pain medications can cause addiction. If you have a history of chemical dependency of any type, you are at a " higher risk of becoming addicted to pain medications.  Only take these prescribed medications to treat your pain when all other options have been tried. Take it for as short a time and as few doses as possible. Store your pain pills in a secure place, as they are frequently stolen and provide a dangerous opportunity for children or visitors in your house to start abusing these powerful medications. We will not replace any lost or stolen medicine.  As soon as your pain is better, you should flush all your remaining medication.     Many prescription pain medications contain Tylenol  (acetaminophen), including Vicodin , Tylenol #3 , Norco , Lortab , and Percocet .  You should not take any extra pills of Tylenol  if you are using these prescription medications or you can get very sick.  Do not ever take more than 3000 mg of acetaminophen in any 24 hour period.    All opioids tend to cause constipation. Drink plenty of water and eat foods that have a lot of fiber, such as fruits, vegetables, prune juice, apple juice and high fiber cereal.  Take a laxative if you don t move your bowels at least every other day. Miralax , Milk of Magnesia, Colace , or Senna  can be used to keep you regular.      Remember that you can always come back to the Emergency Department if you are not able to see your regular doctor in the amount of time listed above, if you get any new symptoms, or if there is anything that worries you.

## 2017-03-26 NOTE — MR AVS SNAPSHOT
After Visit Summary   3/26/2017    Rosemarie Dolan    MRN: [id number]           Patient Information     Date Of Birth          1945        Visit Information        Provider Department      3/26/2017 10:45 AM Sriram Stuart PA-C [facility]        Today's Diagnoses     Dysuria    -  1    Acute cystitis without hematuria           Follow-ups after your visit        Your next 10 appointments already scheduled     Apr 14, 2017  9:40 AM CDT   (Arrive by 9:25 AM)   Return Visit with Von Brizuela MD   [facility] ([facility])    [address] [address]   [phone]            May 09, 2017  9:00 AM CDT   Office Visit with Tegan Bowden MD   [facility] ([facility])    [address] [place] [address] [address]  [facility] [address] [phone]           Bring a current list of meds and any records pertaining to this visit.  For Physicals, please bring immunization records and any forms needing to be filled out.  Please arrive 10 minutes early to complete paperwork.            Nov 30, 2017 10:00 AM CST   Return Sleep Patient with Aldo Longoria MD   [facility] ([facility])    [address] [place] [id number] [phone]              Who to contact     If you have questions or need follow up information about today's clinic visit or your schedule please contact [facility] directly at [phone].  Normal or non-critical lab and imaging results will be communicated to you by MyChart, letter or phone within 4 business days after the clinic has received the results. If you do not hear from us within 7 days, please contact the clinic through [employer] or phone. If you have a critical or abnormal lab result, we will notify you by phone as soon as possible.  Submit refill requests through [employer]  or call your pharmacy and they will forward the refill request to us. Please allow 3 business days for your refill to be completed.          Additional Information About Your Visit        [employer] Information     [employer] gives you secure access to your electronic health record. If you see a primary care provider, you can also send messages to your care team and make appointments. If you have questions, please call your primary care clinic.  If you do not have a primary care provider, please call [phone] and they will assist you.        Care EveryWhere ID     This is your Care EveryWhere ID. This could be used by other organizations to access your [facility] medical records  [id number]        Your Vitals Were     Pulse Temperature Pulse Oximetry BMI (Body Mass Index)          80 98.8  F (37.1  C) (Tympanic) 95% 34.04 kg/m2         Blood Pressure from Last 3 Encounters:   03/26/17 128/71   03/26/17 128/66   02/08/17 138/72    Weight from Last 3 Encounters:   03/26/17 210 lb (95.3 kg)   03/26/17 210 lb 14.4 oz (95.7 kg)   02/08/17 219 lb (99.3 kg)              We Performed the Following     UA reflex to Microscopic        Primary Care Provider Office Phone # Fax #    Tegan Bowden [phone]       [address] [place] [address]        Thank you!     Thank you for choosing [facility]  for your care. Our goal is always to provide you with excellent care. Hearing back from our patients is one way we can continue to improve our services. Please take a few minutes to complete the written survey that you may receive in the mail after your visit with us. Thank you!             Your Updated Medication List - Protect others around you: Learn how to safely use, store and throw away your medicines at www.disposemymeds.org.          This list is accurate as of: 3/26/17 12:03 PM.  Always use your most recent med list.                   Brand Name  Dispense Instructions for use    amLODIPine 5 MG tablet    NORVASC    90 tablet    Take 1 tablet (5 mg) by mouth daily       blood glucose monitoring lancets     1 Box    1 each 2 times daily       blood glucose monitoring test strip    ACCU-CHEK SMARTVIEW    1 Box    Test 1 time a day.       CALCIUM COMPLEX OR      Take by mouth daily 2 tablets daily       clonazePAM 0.5 MG tablet    klonoPIN    30 tablet    Take 0.5 tablets (0.25 mg) by mouth See Admin Instructions One-half po q hs.  May repeat dose in one hour if RLS persist.       losartan 100 MG tablet    COZAAR    90 tablet    Take 1 tablet (100 mg) by mouth daily       magnesium gluconate 500 (27 MG) MG tablet    MAGONATE     Take 1 tablet (500 mg) by mouth 2 times daily       meclizine 25 MG tablet    ANTIVERT    90 tablet    Take 1 tablet (25 mg) by mouth 3 times daily as needed for dizziness       * order for DME     1 Units    Equipment being ordered: TENS       * order for DME     1 Device    Equipment being ordered: SADD lights-10,000 Lux       * order for DME     1 each    Equipment being ordered: Wrist splint       PROBIOTIC PO      Take 1 capsule by mouth daily as needed As needed       rOPINIRole 1 MG tablet    REQUIP    270 tablet    Take 1 tablet (1 mg) by mouth 3 times daily       * STATIN NOT PRESCRIBED (INTENTIONAL)     0 each    Statin not prescribed intentionally due to Intolerance       UNKNOWN MED DOSAGE      Mobysil cream-1 application prn joint pain       VITAMIN B-12 PO          vitamin D 2000 UNITS Caps     30 capsule    Take 2 tablets by mouth daily       zolpidem 5 MG tablet    AMBIEN    30 tablet    Take 1 tablet (5 mg) by mouth nightly as needed for sleep       * Notice:  This list has 4 medication(s) that are the same as other medications prescribed for you. Read the directions carefully, and ask your doctor or other care provider to review them with you.

## 2017-03-26 NOTE — ED NOTES
Vss. Arana removed per pt request and md . 600uo per cath. Pain decreased. Script given and reviewed with pt

## 2017-03-26 NOTE — NURSING NOTE
"Chief Complaint   Patient presents with     Urgent Care     Abdominal Pain     Pt states has had lower abdominal pain x 2 days. States also has pain in lower back, pain while urinating, and pain while standing up.        Initial /66 (BP Location: Right arm, Patient Position: Chair, Cuff Size: Adult Large)  Pulse 80  Temp 98.8  F (37.1  C) (Tympanic)  Wt 210 lb 14.4 oz (95.7 kg)  SpO2 95%  BMI 34.04 kg/m2 Estimated body mass index is 34.04 kg/(m^2) as calculated from the following:    Height as of 2/8/17: 5' 6\" (1.676 m).    Weight as of this encounter: 210 lb 14.4 oz (95.7 kg).  Medication Reconciliation: unable or not appropriate to perform   Andrea Boateng CMA (AAMA) 3/26/2017 10:45 AM    "

## 2017-03-30 NOTE — PROGRESS NOTES
"  SUBJECTIVE:                                                    Rosemarie Dolan is a 72 year old female who presents to clinic today for the following health issues:      ED/UC Followup:    Facility:  [facility] ([place], [place])  Date of visit: 3/26/17  Reason for visit: abdominal pain  Current Status: still having severe abdominal pain, nausea, dizziness, lack of appetite     Still having \"terrible\" lower abdominal cramps bilaterally. Has had nausea and vomiting.     Passed out yesterday while she was vomiting.     Has lost 10 lbs from Feb. She thinks this is related to the illness.     Had diarrhea prior to ED visit. Since then, has had regular soft stools. Is passing gas.     No recent travel or camping.    Has a hx bladder prolapse. Used to use catheters but no longer does that. Not having significant urinary retention in the ED.    ROS: const/neuro/gi/gu/gyn otherwise negative     OBJECTIVE:  /64 (BP Location: Right arm, Patient Position: Chair, Cuff Size: Adult Large)  Pulse 80  Temp 98.9  F (37.2  C) (Tympanic)  Ht 5' 6\" (1.676 m)  Wt 209 lb 6.4 oz (95 kg)  SpO2 97%  BMI 33.8 kg/m2  CONSTITUTIONAL: Alert, well-nourished, well-groomed, NAD  RESP: Lungs CTA. No wheeze, rhonchi, rales.  CV: HRRR S1 S2 No MRG. No peripheral edema  GI: Abdomen flat. BS x 4. TTP bilateral LQ. No HSM or masses. No CVAT      ASSESSMENT/PLAN:  (R10.01) Abdominal pain, generalized  (primary encounter diagnosis)  Comment: 72 year old female with a history of Renal Cell CA, Fibromyalgia and IBS who presents for follow up on her ER visit for abdominal pain.  Patient reports abdominal cramping began 3/24 occurring in the umbilical area and diffuse lower quadrants with associated nausea and emesis. She initially had diarrhea but has since had a soft daily stools. She presented to the ED on 3/26. She did have a post void residual of about 150 ml and a vines catheter was inserted, but this did not result in any significant improvement in her " discomfort. A CT did not show any evidence of an acute inflammatory condition within the abdomen, but noted fluid filled loops of small bowel which could represent enteritis. Patient has continued to have persistent intermittent abdominal cramping with nausea and poor oral intake with daily soft stools. She has been afebrile. Patient does report a hx of IBS with similar cramping in the past although never this severe. DDX includes viral gastroenteritis, mesentery ischemia, IBS flare, bacterial enteritis, autoimmune enteritis. Despite having bouts of intense pain, I don't see any indications of serious or life threatening to warrant another ED visit. Trial of Bentyl was considered for treatment of possible IBS flare, but decided against it because of the anticholinergic side effects as she already has issues with urinary retention.   Plan: Comprehensive metabolic panel, CBC with         platelets differential, ESR: Erythrocyte         sedimentation rate, CRP, inflammation, Lactic         acid, *UA reflex to Microscopic and Culture         ([place] [facility] (except [place]        and [place]),             Will call patient with stat lab results. Discussed reasons to seek care in the ED.         CRISTEL Moreno-ALYSON.

## 2017-03-30 NOTE — MR AVS SNAPSHOT
After Visit Summary   3/30/2017    Rosemarie Dolan    MRN: [id number]           Patient Information     Date Of Birth          1945        Visit Information        Provider Department      3/30/2017 2:00 PM Nohemi Bella APRN [facility] ([facility])        Today's Diagnoses     Abdominal pain, generalized    -  1       Follow-ups after your visit        Your next 10 appointments already scheduled     Apr 14, 2017  9:40 AM CDT   (Arrive by 9:25 AM)   Return Visit with Von Brizuela MD   [facility] ([facility])    [address] [facility]  [address] [facility] [address]   [phone]            May 09, 2017  9:00 AM CDT   Office Visit with Tegan Bowden MD   [facility] ([facility])    [address] [facility] [address]  [facility] [id number] [phone]           Bring a current list of meds and any records pertaining to this visit.  For Physicals, please bring immunization records and any forms needing to be filled out.  Please arrive 10 minutes early to complete paperwork.            Nov 30, 2017 10:00 AM CST   Return Sleep Patient with Aldo Longoria MD   [facility] ([facility])    [address] [place] [address] [address]  [facility] [id number] [phone]              Who to contact     If you have questions or need follow up information about today's clinic visit or your schedule please contact [facility] directly at [phone].  Normal or non-critical lab and imaging results will be communicated to you by MyChart, letter or phone within 4 business days after the clinic has received the results. If you do not hear from us within 7 days, please contact the clinic through MyChart or phone. If you have a critical or abnormal lab result, we will notify you by phone as soon as possible.  Submit refill requests through [employer] or call your pharmacy and  "they will forward the refill request to us. Please allow 3 business days for your refill to be completed.          Additional Information About Your Visit        [employer] Information     [employer] gives you secure access to your electronic health record. If you see a primary care provider, you can also send messages to your care team and make appointments. If you have questions, please call your primary care clinic.  If you do not have a primary care provider, please call [phone] and they will assist you.        Care EveryWhere ID     This is your Care EveryWhere ID. This could be used by other organizations to access your [facility] medical records  [id number]        Your Vitals Were     Pulse Temperature Height Pulse Oximetry BMI (Body Mass Index)       80 98.9  F (37.2  C) (Tympanic) 5' 6\" (1.676 m) 97% 33.8 kg/m2        Blood Pressure from Last 3 Encounters:   04/01/17 124/44   03/30/17 132/64   03/26/17 128/71    Weight from Last 3 Encounters:   03/30/17 209 lb 6.4 oz (95 kg)   03/26/17 210 lb (95.3 kg)   03/26/17 210 lb 14.4 oz (95.7 kg)               Primary Care Provider Office Phone # Fax #    Tegan Bowden [phone]       [address] [place] [address]        Thank you!     Thank you for choosing [facility]  for your care. Our goal is always to provide you with excellent care. Hearing back from our patients is one way we can continue to improve our services. Please take a few minutes to complete the written survey that you may receive in the mail after your visit with us. Thank you!             Your Updated Medication List - Protect others around you: Learn how to safely use, store and throw away your medicines at www.disposemymeds.org.          This list is accurate as of: 3/30/17 11:59 PM.  Always use your most recent med list.                   Brand Name Dispense Instructions for use    blood glucose monitoring lancets     " 1 Box    1 each 2 times daily       blood glucose monitoring test strip    ACCU-CHEK SMARTVIEW    1 Box    Test 1 time a day.       CALCIUM COMPLEX OR      Take by mouth daily 2 tablets daily       losartan 100 MG tablet    COZAAR    90 tablet    Take 1 tablet (100 mg) by mouth daily       magnesium gluconate 500 (27 MG) MG tablet    MAGONATE     Take 1 tablet (500 mg) by mouth 2 times daily       oxyCODONE-acetaminophen 5-325 MG per tablet    PERCOCET    15 tablet    Take 1-2 tablets by mouth every 4 hours as needed for pain       PROBIOTIC PO      Take 1 capsule by mouth daily as needed As needed       rOPINIRole 1 MG tablet    REQUIP    270 tablet    Take 1 tablet (1 mg) by mouth 3 times daily       STATIN NOT PRESCRIBED (INTENTIONAL)     0 each    Statin not prescribed intentionally due to Intolerance       VITAMIN B-12 PO          vitamin D 2000 UNITS Caps     30 capsule    Take 2 tablets by mouth daily

## 2017-03-30 NOTE — NURSING NOTE
"Chief Complaint   Patient presents with     ER F/U       Initial /64 (BP Location: Right arm, Patient Position: Chair, Cuff Size: Adult Large)  Pulse 80  Temp 98.9  F (37.2  C) (Tympanic)  Ht 5' 6\" (1.676 m)  Wt 209 lb 6.4 oz (95 kg)  SpO2 97%  BMI 33.8 kg/m2 Estimated body mass index is 33.8 kg/(m^2) as calculated from the following:    Height as of this encounter: 5' 6\" (1.676 m).    Weight as of this encounter: 209 lb 6.4 oz (95 kg).  Medication Reconciliation: complete   Jewell Ho LPN      "

## 2017-03-31 NOTE — TELEPHONE ENCOUNTER
1. Please ask why she takes the mag gluconate. That can cause abdominal cramping and we may want to stop it  2. Do her sx get worse with eating at all? If so, I'd like to get a RUQ US at [facility]  3. I spoke with Dr. Vigil through [facility]. She would like to see the patient next week. Referral placed. Please help her get an appointment at one of the locations. She may need to drive to [place] if [place] doesn't have a location  4. She also thought the patient should try a PPI. This has been ordered.     Please let me know what she says.

## 2017-03-31 NOTE — TELEPHONE ENCOUNTER
Ok, please have her follow the instructions below AND schedule with [facility] for next week.    Also, please tell her I tried her cell and it wasn't a working number. I must have written it down wrong.

## 2017-03-31 NOTE — IP AVS SNAPSHOT
MRN:[id number]                      After Visit Summary   3/31/2017    Rosemarie Dolan    MRN: [id number]           Thank you!     Thank you for choosing [facility] for your care. Our goal is always to provide you with excellent care. Hearing back from our patients is one way we can continue to improve our services. Please take a few minutes to complete the written survey that you may receive in the mail after you visit. If you would like to speak to someone directly about your visit please contact Patient Relations at [phone]. Thank you!          Patient Information     Date Of Birth          1945        About your hospital stay     You were admitted on:  March 31, 2017 You last received care in the:  [facility] Observation Department    You were discharged on:  April 1, 2017        Reason for your hospital stay       You were admitted for concerns of abd pain and cramping with nausea and vomiting. Your labs were unremarkable except for mild elevation in the inflammatory markers. This is likely secondary to your viral intestinal infection. Your CT scan of the abd did not show anything dangerous.                  Who to Call     For medical emergencies, please call 911.  For non-urgent questions about your medical care, please call your primary care provider or clinic, [phone]          Attending Provider     Provider Specialty    Mayelin Vela MD Emergency Medicine    Neymar Paul,  Internal Medicine       Primary Care Provider Office Phone # Fax #    Tegan Bowden [phone]       [facility] [address] [place] [address]        After Care Instructions     Activity       Your activity upon discharge: activity as tolerated            Diet       Follow this diet upon discharge: Orders Placed This Encounter      Advance Diet as Tolerated: Regular Diet Adult (gluten free per pt  request.); Regular Diet Adult                  Follow-up Appointments     Follow-up and recommended labs and tests        Follow up with primary care provider, Tegan Bowden, within 7 days for hospital follow- up.  The following labs/tests are recommended: Needs pelvic US to further assess the findings of the endometrium seen on CT scan of the abd.                  Your next 10 appointments already scheduled     Apr 14, 2017  9:40 AM CDT   (Arrive by 9:25 AM)   Return Visit with Von Brizuela MD   [facility] ([facility])    [address]  3rd Floor  [facility] [facility] [id number] [phone]            May 09, 2017  9:00 AM CDT   Office Visit with Tegan Bowden MD   [facility] ([facility])    [address]  [address]  [facility] [address] [phone]           Bring a current list of meds and any records pertaining to this visit.  For Physicals, please bring immunization records and any forms needing to be filled out.  Please arrive 10 minutes early to complete paperwork.            Nov 30, 2017 10:00 AM CST   Return Sleep Patient with Aldo Longoria MD   [facility] ([facility])    [address]  [facility] [place] [id number] [phone]                         Pending Results     No orders found for last 3 day(s).            Statement of Approval     Ordered          04/01/17 1039  I have reviewed and agree with all the recommendations and orders detailed in this document.  EFFECTIVE NOW     Approved and electronically signed by:  Tegan Rolle PA-C             Admission Information     Date & Time Provider Department Dept. Phone    3/31/2017 Neymar Paul,  [facility] Observation Department [phone]      Your Vitals Were     Blood Pressure Temperature Respirations Pulse Oximetry          124/44 (BP Location: Left arm) 98.4   F (36.9  C) (Oral) 18 96%        [employer] Information     [employer] gives you secure access to your electronic health record. If you see a primary care provider, you can also send messages to your care team and make appointments. If you have questions, please call your primary care clinic.  If you do not have a primary care provider, please call [phone] and they will assist you.        Care EveryWhere ID     This is your Care EveryWhere ID. This could be used by other organizations to access your [facility] medical records  [id number]           Review of your medicines      START taking        Dose / Directions    dicyclomine 10 MG capsule   Commonly known as:  BENTYL   Used for:  Abdominal pain, generalized        Dose:  20 mg   Take 2 capsules (20 mg) by mouth 4 times daily as needed   Quantity:  45 capsule   Refills:  0       docusate sodium 100 MG tablet   Commonly known as:  COLACE   Used for:  Abdominal pain, generalized        Dose:  100 mg   Take 100 mg by mouth daily   Quantity:  30 tablet   Refills:  1       ondansetron 4 MG ODT tab   Commonly known as:  ZOFRAN ODT   Used for:  Abdominal pain, generalized        Dose:  4-8 mg   Take 1-2 tablets (4-8 mg) by mouth 3 times daily (before meals)   Quantity:  20 tablet   Refills:  1         CONTINUE these medicines which have NOT CHANGED        Dose / Directions    blood glucose monitoring lancets   Used for:  Type 2 diabetes mellitus without complication, without long-term current use of insulin (H)        Dose:  1 each   1 each 2 times daily   Quantity:  1 Box   Refills:  11       blood glucose monitoring test strip   Commonly known as:  ACCU-CHEK SMARTVIEW   Used for:  Type 2 diabetes mellitus without complication, without long-term current use of insulin (H)        Test 1 time a day.   Quantity:  1 Box   Refills:  11       CALCIUM COMPLEX OR        Take by mouth daily 2 tablets daily   Refills:  0       losartan 100 MG tablet   Commonly known as:  COZAAR    Used for:  Benign essential hypertension        Dose:  100 mg   Take 1 tablet (100 mg) by mouth daily   Quantity:  90 tablet   Refills:  3       magnesium gluconate 500 (27 MG) MG tablet   Commonly known as:  MAGONATE        Dose:  500 mg   Take 1 tablet (500 mg) by mouth 2 times daily   Refills:  0       MECLIZINE HCL PO        Dose:  12.5 mg   Take 12.5 mg by mouth 3 times daily as needed for dizziness   Refills:  0       NORTRIPTYLINE HCL PO        Dose:  10 mg   Take 10 mg by mouth daily Just resumed 3/30/17, only one dose taken PTA   Refills:  0       NORVASC PO        Dose:  5 mg   Take 5 mg by mouth every evening   Refills:  0       oxyCODONE-acetaminophen 5-325 MG per tablet   Commonly known as:  PERCOCET   Used for:  Abdominal pain, generalized        Dose:  1-2 tablet   Take 1-2 tablets by mouth every 4 hours as needed for pain   Quantity:  15 tablet   Refills:  0       PROBIOTIC PO        Dose:  1 capsule   Take 1 capsule by mouth daily as needed As needed   Refills:  0       rOPINIRole 1 MG tablet   Commonly known as:  REQUIP   Used for:  Restless legs syndrome (RLS)        Dose:  1 mg   Take 1 tablet (1 mg) by mouth 3 times daily   Quantity:  270 tablet   Refills:  3       STATIN NOT PRESCRIBED (INTENTIONAL)        Statin not prescribed intentionally due to Intolerance   Quantity:  0 each   Refills:  0       VITAMIN B-12 PO        Refills:  0       vitamin D 2000 UNITS Caps        Dose:  2 tablet   Take 2 tablets by mouth daily   Quantity:  30 capsule   Refills:  0            Where to get your medicines      These medications were sent to [facility] [address] [address] [address]     Phone:  [phone]     dicyclomine 10 MG capsule    docusate sodium 100 MG tablet    ondansetron 4 MG ODT tab         Some of these will need a paper prescription and others can be bought over the counter. Ask your nurse if you have questions.      Bring a paper prescription for each of these medications     oxyCODONE-acetaminophen 5-325 MG per tablet                Protect others around you: Learn how to safely use, store and throw away your medicines at www.disposemymeds.org.             Medication List: This is a list of all your medications and when to take them. Check marks below indicate your daily home schedule. Keep this list as a reference.      Medications           Morning Afternoon Evening Bedtime As Needed    blood glucose monitoring lancets   1 each 2 times daily                                blood glucose monitoring test strip   Commonly known as:  ACCU-CHEK SMARTVIEW   Test 1 time a day.                                CALCIUM COMPLEX OR   Take by mouth daily 2 tablets daily                                dicyclomine 10 MG capsule   Commonly known as:  BENTYL   Take 2 capsules (20 mg) by mouth 4 times daily as needed                                docusate sodium 100 MG tablet   Commonly known as:  COLACE   Take 100 mg by mouth daily                                losartan 100 MG tablet   Commonly known as:  COZAAR   Take 1 tablet (100 mg) by mouth daily   Last time this was given:  100 mg on 4/1/2017  8:37 AM                                magnesium gluconate 500 (27 MG) MG tablet   Commonly known as:  MAGONATE   Take 1 tablet (500 mg) by mouth 2 times daily                                MECLIZINE HCL PO   Take 12.5 mg by mouth 3 times daily as needed for dizziness                                NORTRIPTYLINE HCL PO   Take 10 mg by mouth daily Just resumed 3/30/17, only one dose taken PTA   Last time this was given:  10 mg on 4/1/2017 10:37 AM                                NORVASC PO   Take 5 mg by mouth every evening                                ondansetron 4 MG ODT tab   Commonly known as:  ZOFRAN ODT   Take 1-2 tablets (4-8 mg) by mouth 3 times daily (before meals)                                oxyCODONE-acetaminophen 5-325 MG per  tablet   Commonly known as:  PERCOCET   Take 1-2 tablets by mouth every 4 hours as needed for pain                                PROBIOTIC PO   Take 1 capsule by mouth daily as needed As needed                                rOPINIRole 1 MG tablet   Commonly known as:  REQUIP   Take 1 tablet (1 mg) by mouth 3 times daily   Last time this was given:  1 mg on 4/1/2017 10:37 AM                                STATIN NOT PRESCRIBED (INTENTIONAL)   Statin not prescribed intentionally due to Intolerance                                VITAMIN B-12 PO                                vitamin D 2000 UNITS Caps   Take 2 tablets by mouth daily

## 2017-03-31 NOTE — ED PROVIDER NOTES
Emergency Department Attending Supervision Note  3/31/2017  6:52 PM      I evaluated this patient in conjunction with Sydni Elaine NP      Briefly, the patient presented with increased abdominal cramping. The patient was previously seen in the ER for abdominal cramping as well as urinary retention. At that time, the patient had a full workup including CT scan that showed no clear etiology of her pain. Since that time, her cramping has increased and is accompanied with intermittent episodes of nausea thus prompting her to return to the emergency department.       Exam:    Gen: alert  HEENT: PERRL, oropharynx clear  CV: RRR, no murmurs  Pulm: breath sounds equal, lungs clear  Abd: Soft, epigastrium nontender, RUQ mildly tender, LUQ mildly tender, RLQ mildly tender, LLQ mildly tender  Back: no left CVA tenderness, no right CVA tenderness  MSK: no LE edema  Skin: no rash  Neuro: alert, appropriate conversation and interaction    MDM and Plan:  Rosemarie Dolan is a 72 year old female who presents for abdominal pain. Laboratory studies are reassuring. Recent visit reviewed. Due to increasing pain and significant tenderness on exam, CT was repeated. This showed mild diffuse stranding throughout the abdominal mesentery. Thought to be non specific. No infectious symptoms such as fever or leukocytosis at this time to make me think that she requires antibiotics.  Symptoms not consistent with mesenteric ischemia.  Plan admission for pain control, hydration and possible GI consultation.       Diagnosis    ICD-10-CM    1. Abdominal pain, generalized R10.84 UA with Microscopic     Lipase     Lipase     CANCELED: Lipase             Mayelin Vela MD  03/31/17 2057

## 2017-03-31 NOTE — TELEPHONE ENCOUNTER
Patient states she takes it because she thought she needed magnesium. States she only took a few pills last week. Patient began panting and stated she was in extreme pain while on the phone. Couldn't talk in complete sentences. Stated she was having difficulty standing. Recommended patient go to ER at [facility] now. She will have her  drive her. Was unable to complete the phone call due to difficulty understanding patient due to panting. Asked to call 911 but patient refused.  Kimberly Mike RN

## 2017-03-31 NOTE — ED NOTES
Pt was seen in ED on Sunday with abdominal pain and scan done.  Pt reports pain relief with pain medication but still feels cramping.  Pt is now nauseated and vomiting which is not getting any better and pain continues.

## 2017-03-31 NOTE — ED PROVIDER NOTES
History     Chief Complaint:  Abdominal Pain and Nausea & Vomiting      HPI   Rosemarie Dolan is a 72 year old female with history of renal cell carcinoma, bladder prolapse, S/P right partial nephrectomy, and IBS who presents with abdominal cramping.  Pt was seen in ER on 3/26/17 for urinary retention and abdominal cramping and had a full work up at that time including CT scan that showed no clear etiology for pt's pain.  Pt was seen at clinic yesterday and had normal UA.  Pt reports diarrhea or vomiting resolved after her ER visit, she had a soft, non bloody formed stool today.  She reports the cramping has increased.  It comes in waves with nausea.  Today pt reports cramping episode that caused her to double over and become dizzy and lightheaded.  She has had decreased PO intake today due to nausea.  She denies fever.  Pt reports she has had IBS in the past with episodes of diarrhea and cramping that resolves with defecation.  Pt took a Percoset at 1630 today before coming to ER.  She rates her pain as 5/10.  Pt notes she has had slight swelling of her lower legs over the past couple weeks.  She denies pain in her legs, chest pain, or shortness of breath.      Allergies:  Gluten: AP and BP  Lyrica: palpitations  Naproxen: nausea and vomiting  Penicillins: rash  Sulfa drugs: anaphylactic shock     Medications:    Amlodipine  Losartan  Requip  Ambien  Meclizine  Clonazepam      Past Medical History:    Sleep apnea  Renal cell carcinoma  Osteoarthrosis  IBS  Intramural leiomyoma of uterus  HLD  Fibromyalgia  HTN  Esophageal reflux  DM  Depressive disorder  Heart murmur     Past Surgical History:   Breast biopsy  D & C  Bilateral knee surgeries  Nephrectomy, partial, right     Family History:   History reviewed. No pertinent family history.      Social History:  Marital status:   Never smoker, negative for alcohol use.     Review of Systems   Constitutional: Negative for fever.   Respiratory: Negative for  cough, chest tightness and shortness of breath.    Cardiovascular: Positive for leg swelling. Negative for chest pain.        Pt notes minor lower extremity swelling    Gastrointestinal: Positive for abdominal pain and nausea. Negative for blood in stool, constipation, diarrhea and vomiting.   Genitourinary: Negative for decreased urine volume, difficulty urinating, dysuria, flank pain and frequency.   All other systems reviewed and are negative.    Physical Exam   First Vitals:  BP: 158/78  Heart Rate: 80  Temp: 98.7  F (37.1  C)  Resp: 18  SpO2: 99 %    Physical Exam  General:  Alert, no obvious discomfort, well kept.  Eyes:  PERRL, conjunctivae pink, no scleral icterus or conjunctival injection.  ENT:  Moist mucus membranes, oropharynx clear without erythema or exudates, no lymphadenopathy, normal voice.    Resp:  Lungs clear to auscultation bilaterally, no rales/rhonchi/wheezes. Good air movement to bases, normal respiratory effort.  CV:  Normal rate and rhythm, no murmurs/rubs/gallops.  GI:  Abdomen soft and nondistended.  Normoactive BS.  Diffuse tenderness of lower quadrants with palpation, no guarding or rebound, no palpable masses.  Skin:  Warm, dry.  No rashes, lesions, or petechiae.  Musculoskeletal: Mild peripheral edema of lower extremities, no calf tenderness, normal gross ROM.   Neuro: Alert and oriented to person/place/time, normal sensation.  Responds appropriately to questions and commands.  Normal muscle tone.  Psychiatric: Normal mood and affect, cooperative, good eye contact.    Emergency Department Course   Imaging:  CT Abdomen Pelvis w Contrast   Final Result   IMPRESSION:      1. Mild diffuse fat stranding throughout the abdominal mesentery,   stable to slightly increased in prominence compared to prior exam.   This is nonspecific but could reflect a nonlocalized mesenteritis or   other diffuse inflammatory process.   2. The remainder the examination is otherwise stable.      MERCEDES AMANDA MD         Laboratory:  CBC: RBC 3.69(L), HGB 11.0(L), HCT 34.4(L), o/w WNL (WBC 8.2, HGB 11.0, )  CMP: Glucose 109(H), o/w WNL (Creatinine 0.87)  Lipase:  90  Lactic acid:  0.6(L)  UA: Clear, yellow urine: Bacteria few(A), squamous epithelial/HPF 3(H), mucous present (A), o/w WNL    Interventions:  Medications   0.9% sodium chloride BOLUS (0 mLs Intravenous Stopped 3/31/17 2030)     Followed by   0.9% sodium chloride infusion (not administered)   hyoscyamine (LEVSIN/SL) sublingual tablet 125 mcg (125 mcg Oral Given 3/31/17 1918)   0.9% sodium chloride BOLUS (0 mLs Intravenous Stopped 3/31/17 2017)   iopamidol (ISOVUE-370) solution 500 mL (100 mLs Intravenous Given 3/31/17 2011)     Emergency Department Course:  Nursing notes and vitals reviewed.  I performed an exam of the patient as documented above. GCS 15.  Pt denies nausea at this time, reports her pain 5/10.  She refuses further pain medication at this time.    Blood drawn. This was sent to the lab for further testing, results above.  Medicine administered as documented above.   Dr. Vela in to see pt.   Pt to CT  2050-Spoke with NICANOR Iyer in regards to pt, plan for pt admission  2110-NICANOR Iyer in to see pt.  Findings and plan explained to the Patient. Plan for admission for further monitoring, pain control,  and GI consult.     ED Course       Impression & Plan    Medical Decision Making:  Rosemarie Dolan is a 72 year old female with history of renal cell carcinoma, bladder prolapse, S/P right partial nephrectomy, and IBS who presents with abdominal cramping.  Pt was seen in ER on 3/26/17 with similar complaints and had CT imaging done at that time.  Pt has had increasing abdominal cramping which prompted her visit to the ER tonight.  On exam pt with diffuse lower quadrant pain bilaterally.  Her labs were unremarkable.  Pt sent for repeat CT scan which shows an increased prominence of diffuse fat stranding throughout the abdominal mesentery. This  is non specific, however could reflect a non localized mesenteritis or other diffuse inflammatory process.  Pt discussed with NICANOR Iyer and plan for admission to observation unit for further monitoring, pain control, and GI consult.      Diagnosis:    ICD-10-CM    1. Abdominal pain, generalized R10.84 UA with Microscopic     Lipase     Lipase     CANCELED: Lipase       Disposition:  Admitted to observation.    Discharge Medications:  Current Discharge Medication List          Sydni CORTES FNP-C, interviewed the patient, explained the course of action and discussed the patient with Dr. Vela who then evaluated the patient.         Sydni Elaine NP  03/31/17 2206

## 2017-03-31 NOTE — IP AVS SNAPSHOT
[facility] Observation Department    [address]    [facility] [address]    Phone:  [phone]                                       After Visit Summary   3/31/2017    Rosemarie Dolan    MRN: [id number]           After Visit Summary Signature Page     I have received my discharge instructions, and my questions have been answered. I have discussed any challenges I see with this plan with the nurse or doctor.    ..........................................................................................................................................  Patient/Patient Representative Signature      ..........................................................................................................................................  Patient Representative Print Name and Relationship to Patient    ..................................................               ................................................  Date                                            Time    ..........................................................................................................................................  Reviewed by Signature/Title    ...................................................              ..............................................  Date                                                            Time

## 2017-04-01 NOTE — PHARMACY-ADMISSION MEDICATION HISTORY
Admission medication history interview status for this patient is complete. See Psychiatric admission navigator for allergy information, prior to admission medications and immunization status.     Medication history interview source(s):Patient  Medication history resources (including written lists, pill bottles, clinic record): Epic record, clinic notes  Primary pharmacy:    Changes made to PTA medication list:  Added: Nortriptylline just resumed 3/30/17 per patient  Deleted: Ambien, Clonazepam due to patient states has not been using while on narcotics  Changed: Amlodipine to evening, Antivert to 12.5 mg    Actions taken by pharmacist (provider contacted, etc): Sticky note     Additional medication history information:None    Medication reconciliation/reorder completed by provider prior to medication history? Yes    For patients on insulin therapy: No     Prior to Admission medications    Medication Sig Last Dose Taking? Auth Provider   AmLODIPine Besylate (NORVASC PO) Take 5 mg by mouth every evening 3/30/2017 at pm Yes Unknown, Entered By History   MECLIZINE HCL PO Take 12.5 mg by mouth 3 times daily as needed for dizziness Past Week at Unknown time Yes Unknown, Entered By History   NORTRIPTYLINE HCL PO Take 10 mg by mouth daily Just resumed 3/30/17, only one dose taken PTA 3/30/2017 Yes Unknown, Entered By History   oxyCODONE-acetaminophen (PERCOCET) 5-325 MG per tablet Take 1-2 tablets by mouth every 4 hours as needed for pain Past Week at Unknown time Yes Ivania Browne MD   rOPINIRole (REQUIP) 1 MG tablet Take 1 tablet (1 mg) by mouth 3 times daily 3/31/2017 at Unknown time Yes Tegan Bowden MD   losartan (COZAAR) 100 MG tablet Take 1 tablet (100 mg) by mouth daily 3/30/2017 at Unknown time Yes Tegan Bowden MD   Cyanocobalamin (VITAMIN B-12 PO)  Past Week at Unknown time Yes Reported, Patient   Multiple Vitamin (CALCIUM COMPLEX OR) Take by mouth daily 2 tablets daily Past Week at  Unknown time Yes Reported, Patient   magnesium gluconate (MAGONATE) 500 (27 MG) MG tablet Take 1 tablet (500 mg) by mouth 2 times daily Past Week at Unknown time Yes Tegan Bowden MD   Cholecalciferol (VITAMIN D) 2000 UNITS CAPS Take 2 tablets by mouth daily Past Week at Unknown time Yes Tegan Bowden MD   Probiotic Product (PROBIOTIC PO) Take 1 capsule by mouth daily as needed As needed Past Month at Unknown time Yes Reported, Patient   blood glucose monitoring (ACCU-CHEK FASTCLIX) lancets 1 each 2 times daily   Tegan Bowden MD   blood glucose monitoring (ACCU-CHEK SMARTVIEW) test strip Test 1 time a day.   Tegan Bowden MD   STATIN NOT PRESCRIBED, INTENTIONAL, Statin not prescribed intentionally due to Intolerance   Tegan Bowden MD

## 2017-04-01 NOTE — PLAN OF CARE
Problem: Discharge Planning  Goal: Discharge Planning (Adult, OB, Behavioral, Peds)  Outcome: No Change  PRIMARY DIAGNOSIS: Acute Traumatic Pain  OUTPATIENT/OBSERVATION GOALS TO BE MET BEFORE DISCHARGE:     1. Tolerate PO Intake: Yes  2. Cleared for discharge from consultants involved: Yes  3. ADLs back to baseline?  Yes  4. Activity and level of assistance: Ambulating independently.  5. Pain status: Improved-controlled with oral pain medications.  6. Barriers to discharge noted No     Patient is alert and oriented, VSS, patient reports pain (cramping) in abdomen but says it is tolerable rating it a 4/10. Patient is up independently in room and ordering breakfast. Patient states she has not had an diarrhea only had a soft stool a couple days ago. Will continue to monitor.

## 2017-04-01 NOTE — PLAN OF CARE
"Problem: Discharge Planning  Goal: Discharge Planning (Adult, OB, Behavioral, Peds)  Outcome: No Change  PRIMARY DIAGNOSIS: abdominal pain  Primary Symptoms:abdominal pain     OUTPATIENT/OBSERVATION GOALS TO BE MET BEFORE DISCHARGE:     1. Orthostatic symptoms (BP decrease or HR increase with patient upright)?  N/A  2. Documented urine output: Yes  3. Tolerating PO fluid: Yes  4. Nausea/Vomiting/Diarrhea/ Abdominal Pain: (if present) symptoms improved: No. Abdominal pain continues. Pt states , \"I'm just not feeling well.\"     Pt c/o 5/10 (pain goal 5/10) diffuse abdominal, describing it as \"pressure\". Also c/o headache - declines any interventions for either complaint. Pt also c/o not being able to void. Bladder scanned for 280ml. Will continue to monitor. Pt tolerating clears  and states she would like to order something gluten free for breakfast. Will advance diet.                   "

## 2017-04-01 NOTE — PROGRESS NOTES
"Patient refused CPAP stated \" will bring hers tomorrow if she is here\".    Kathi Kirby  March 31, 2017.10:35 PM        "

## 2017-04-01 NOTE — DISCHARGE SUMMARY
Counts include 234 beds at the [facility]  Discharge Summary        Rsoemarie Dolan MRN# [id number]   YOB: 1945 Age: 72 year old     Date of Admission:  3/31/2017  Date of Discharge:  4/1/2017  Admitting Physician:  Neymar Paul DO  Discharge Physician: DIEUDONNE LorenzoC  Discharging Service: Hospitalist      Primary Provider: Tegan Bowden  Primary Care Physician Phone Number: [phone]         Primary Discharge Diagnoses:    Rosemarie Dolan was admitted on 3/31/2017 for concerns of 1 week hx of lower abd cramping with assoc nausea, vomiting. Labs and CT scans c/w likely viral gastroenteritis.     1. Acute Gastroenteritis: suspect viral causes. Sxs improved.         Secondary Discharge Diagnoses:     Past Medical History:   Diagnosis Date     Essential hypertension      Fibromyalgia      High cholesterol      History of blood transfusion      Intramural leiomyoma of uterus      Irritable bowel syndrome      Osteoarthrosis      Renal cell carcinoma     s/p partial right nephrectomy ([facility])     Sleep apnea     BI Pap            Code Status:      Full Code        Brief Hospital Summary:       Reason for your hospital stay      You were admitted for concerns of abd pain and cramping with nausea and   vomiting. Your labs were unremarkable except for mild elevation in the   inflammatory markers. This is likely secondary to your viral intestinal   infection. Your CT scan of the abd did not show anything dangerous.        Please refer to initial admission history and physical for further details.   Briefly, Rosemarie Dolan is a 72 year old female with a history of Renal Cell CA s/p right partial nephrectomy, JONEL, Restless Legs Syndrome, Anxiety, Fibromyalgia and IBS who presents to the ED today 2/2 abdominal pain. Patient reports abdominal cramping began 3/24 occurring in the umbilical area and diffuse lower quadrants with associated nausea and emesis. She denies diarrhea, but  reports soft stools daily. She continued to have diffuse intermittent cramping and nausea with no further episodes of emesis. She complained of bladder cramping with the sensation of incomplete bladder emptying and dysuria, so she presented to the ED on 3/26. She did have a post void residual of about 150 ml and a vines catheter was inserted, but this did not result in any significant improvement in her discomfort. A CT did not show any evidence of an acute inflammatory condition within the abdomen, but noted fluid filled loops of small bowel which could represent enteritis. Also noted was an area of hypoattenuation within the uterus. Pain improved with IV Dilaudid and patient was discharged her with Percocet as needed, follow up with OB/GYN. Patient has continued to have persistent intermittent abdominal cramping with nausea and poor oral intake with daily soft stools. She has been afebrile. Patient does report a hx of IBS with similar cramping in the past although never this severe. She was contacted by her PCP regarding mild elevation in her in inflammatory markers and given her increased abd cramping, she re-presented to the ED.     Work up in the ED again did not reveal any significant intra abd abnormality. She did have some abnl endometrium that could be potential necrotic fibroid. Apparently she had had some abnl endometrium work up in the past inc D&C at [facility] 3-4 yrs ago. This will need to be followed up with a repeat outpt Pelvic US which was explained to the patient.    Rest of the CT was non specific with mild inflammation of the mesentery fat. She was started on IVF and her pain was controlled with PRN percocet. She described spasms with a trial of Bentyl can be considered. Her sxs improved so she will be discharged with medications for supportive therapy and follow up with PCP in 1 week.            Significant Lab During Hospitalization:        Recent Labs  Lab 04/01/17  0630 03/31/17  6660  03/30/17  1535   WBC 6.4 8.2 6.7   HGB 9.4* 11.0* 11.1*   HCT 29.0* 34.4* 33.8*   MCV 93 93 93    210 200       Recent Labs  Lab 04/01/17  0630 03/31/17  1705 03/30/17  1535    140 141   POTASSIUM 3.9 3.7 3.6   CHLORIDE 110* 105 106   CO2 25 29 27   ANIONGAP 5 6 8   GLC 98 109* 112*   BUN 11 18 15   CR 0.66 0.87 0.73   GFRESTIMATED 88 64 78   GFRESTBLACK >90African American GFR Calc 78 >90African American GFR Calc   WILLIAM 7.9* 8.8 8.8       Recent Labs  Lab 04/01/17  0630 03/30/17  1535   SED 52* 72*   CRP 77.0* 73.5*       Recent Labs  Lab 03/31/17  1705 03/30/17  1535 03/26/17  1250   AST 21 22 17   ALT 32 33 24   ALKPHOS 103 97 90   BILITOTAL 0.7 0.5 0.4       Recent Labs  Lab 03/31/17  1705 03/30/17  1535   LACT 0.6* 0.8       Recent Labs  Lab 03/31/17  1705   LIPASE 90       Recent Labs  Lab 03/31/17  2110  03/26/17  1057   COLOR Yellow  < > Yellow   APPEARANCE Slightly Cloudy  < > Clear   URINEGLC Negative  < > Negative   URINEBILI Negative  < > Negative   URINEKETONE 5*  < > Negative   SG 1.015  < > 1.020   UBLD Negative  < > Negative   URINEPH 5.0  < > 6.0   PROTEIN Negative  < > Negative   UROBILINOGEN  --   --  0.2   NITRITE Negative  < > Negative   LEUKEST Negative  < > Negative   RBCU 1  < >  --    WBCU 2  < >  --    < > = values in this interval not displayed.             Significant Imaging During Hospitalization:      Results for orders placed or performed during the hospital encounter of 03/31/17   CT Abdomen Pelvis w Contrast    Narrative    CT ABDOMEN PELVIS W CONTRAST 3/31/2017 8:26 PM    HISTORY: Abdominal pain.    TECHNIQUE: CT of the abdomen and pelvis is performed with 100mL  Isovue-370 intravenously. Radiation dose for this scan was reduced  using automated exposure control, adjustment of the mA and/or kV  according to patient size, or iterative reconstruction technique.    COMPARISON: March 26, 2017.    FINDINGS:    Liver:  Normal.  Gallbladder:Normal.  Pancreas:Normal.  Spleen:Normal.  Adrenals:Normal.  Ascites:None.    Kidneys: Small bilateral renal cysts. No evidence of hydronephrosis.  Bladder:Normal.  Pelvic free fluid: Air-fluid level in the bladder.    Bowels: No evidence of obstruction.  Appendix: Not well visualized.    Abdominal or pelvic lymphadenopathy:None.    Miscellaneous findings: There is mild fat stranding throughout the  mesentery, similar in appearance to prior exam. Stable cystic  appearance of the uterus, as before.      Impression    IMPRESSION:    1. Mild diffuse fat stranding throughout the abdominal mesentery,  stable to slightly increased in prominence compared to prior exam.  This is nonspecific but could reflect a nonlocalized mesenteritis or  other diffuse inflammatory process.  2. The remainder the examination is otherwise stable.    MERCEDES AMANDA MD              Pending Results:        Unresulted Labs Ordered in the Past 30 Days of this Admission     No orders found for last 61 day(s).              Consultations This Hospital Stay:      No consultations were requested during this admission         Discharge Instructions and Follow-Up:      Follow up with primary care provider, Tegan Bowden, within 7   days for hospital follow- up.  The following labs/tests are recommended:   Needs pelvic US to further assess the findings of the endometrium seen on   CT scan of the abd/pelvis.            Discharge Disposition:      Discharged to home         Discharge Medications:        Current Discharge Medication List      START taking these medications    Details   docusate sodium (COLACE) 100 MG tablet Take 100 mg by mouth daily  Qty: 30 tablet, Refills: 1    Associated Diagnoses: Abdominal pain, generalized      dicyclomine (BENTYL) 10 MG capsule Take 2 capsules (20 mg) by mouth 4 times daily as needed  Qty: 45 capsule, Refills: 0    Associated Diagnoses: Abdominal pain, generalized         CONTINUE these  medications which have CHANGED    Details   oxyCODONE-acetaminophen (PERCOCET) 5-325 MG per tablet Take 1-2 tablets by mouth every 4 hours as needed for pain  Qty: 15 tablet, Refills: 0    Associated Diagnoses: Abdominal pain, generalized         CONTINUE these medications which have NOT CHANGED    Details   AmLODIPine Besylate (NORVASC PO) Take 5 mg by mouth every evening      MECLIZINE HCL PO Take 12.5 mg by mouth 3 times daily as needed for dizziness      NORTRIPTYLINE HCL PO Take 10 mg by mouth daily Just resumed 3/30/17, only one dose taken PTA      rOPINIRole (REQUIP) 1 MG tablet Take 1 tablet (1 mg) by mouth 3 times daily  Qty: 270 tablet, Refills: 3    Associated Diagnoses: Restless legs syndrome (RLS)      losartan (COZAAR) 100 MG tablet Take 1 tablet (100 mg) by mouth daily  Qty: 90 tablet, Refills: 3    Associated Diagnoses: Benign essential hypertension      Cyanocobalamin (VITAMIN B-12 PO)       Multiple Vitamin (CALCIUM COMPLEX OR) Take by mouth daily 2 tablets daily      magnesium gluconate (MAGONATE) 500 (27 MG) MG tablet Take 1 tablet (500 mg) by mouth 2 times daily      Cholecalciferol (VITAMIN D) 2000 UNITS CAPS Take 2 tablets by mouth daily  Qty: 30 capsule      Probiotic Product (PROBIOTIC PO) Take 1 capsule by mouth daily as needed As needed      blood glucose monitoring (ACCU-CHEK FASTCLIX) lancets 1 each 2 times daily  Qty: 1 Box, Refills: 11    Associated Diagnoses: Type 2 diabetes mellitus without complication, without long-term current use of insulin (H)      blood glucose monitoring (ACCU-CHEK SMARTVIEW) test strip Test 1 time a day.  Qty: 1 Box, Refills: 11    Associated Diagnoses: Type 2 diabetes mellitus without complication, without long-term current use of insulin (H)      STATIN NOT PRESCRIBED, INTENTIONAL, Statin not prescribed intentionally due to Intolerance  Qty: 0 each, Refills: 0         STOP taking these medications       clonazePAM (KLONOPIN) 0.5 MG tablet Comments:   Reason  for Stopping:                 Allergies:         Allergies   Allergen Reactions     Gluten      Abdominal and back pain       Lyrica      Heart pounding and anxiety, loss of balance     Naproxen Nausea and Vomiting     No Clinical Screening - See Comments      Neoprene-contact rash     Penicillins      rash     Sulfa Drugs      anaphalactic shock age 1           Condition and Physical on Discharge:      Discharge condition: Stable   Vitals: Blood pressure 124/44, temperature 98.4  F (36.9  C), temperature source Oral, resp. rate 18, SpO2 96 %, not currently breastfeeding.  0 lbs 0 oz      GENERAL:  Comfortable.  PSYCH: pleasant, oriented, No acute distress.  HEENT:  PERRLA. Normal conjunctiva, normal hearing, nasal mucosa and Oropharynx are normal.  NECK:  Supple, no neck vein distention, adenopathy or bruits, normal thyroid.  HEART:  Normal S1, S2 with no murmur, no pericardial rub, gallops or S3 or S4.  LUNGS:  Clear to auscultation, normal Respiratory effort. No wheezing, rales or ronchi.  ABDOMEN:  Soft, no hepatosplenomegaly, normal bowel sounds. Mild generalized abd tenderenss to palpation in the lower quad, but no rebound or guarding, non distended.   EXTREMITIES:  No pedal edema, +2 pulses bilateral and equal.  SKIN:  Dry to touch, No rash, wound or ulcerations.  NEUROLOGIC:  CN 2-12 intact, BL 5/5 symmetric upper and lower extremity strength, sensation is intact with no focal deficits.

## 2017-04-01 NOTE — PLAN OF CARE
Problem: Discharge Planning  Goal: Discharge Planning (Adult, OB, Behavioral, Peds)  Outcome: Improving  Patient's After Visit Summary was reviewed with patient.  Patient verbalized understanding of After Visit Summary, recommended follow up and was given an opportunity to ask questions.   Discharge medications sent home with patient/family: YES,    Discharged with significant other     OBSERVATION patient END time: 1220

## 2017-04-01 NOTE — H&P
[facility]  Observation Unit  H & P      Patient Name: Rosemarie Dolan MRN# [id number]   Age: 72 year old YOB: 1945     Date of Admission:3/31/2017    Primary care provider: Tegan Bowden  Date of Service: 3/31/2017         Assessment and Plan:   Rosemarie Dolan is a 72 year old female with a history of Renal Cell CA s/p right partial nephrectomy, JONEL, Restless Legs Syndrome, Anxiety, Fibromyalgia and IBS who presents to the ED today 2/2 abdominal pain.    Acute Abdominal Pain - diffuse, crampy abdominal pain with nausea and poor oral intake x1 week.  Initially with episodes of emesis now resolved with soft daily stools.    ED work up revealed patient hemodynamically stable, afebrile on room air.  Laboratory work up revealed Hgb 11,  (CRP 3/30 73.5) with otherwise normal CMP, Lactic Acid, CBC, Lipase and UA.  CT abd/pelvis revealed mild diffuse fat stranding throughout the abdominal mesentery, stable to slightly increased in prominence compared to prior exam. Radiology read states this is nonspecific but could reflect a nonlocalized mesenteritis or other diffuse inflammatory process.  Last colonoscopy 5/2014 with normal findings and biopsies taken(unable to view bx results at this time)  Unclear etiology; IBS, vs Infectious vs other inflammatory process?  - IVF hydration  - antiemetics and pain control  - send stool samples for cdiff and enteric panel  - repeat CRP and ESR in am  - consider GI consult in am.    HTN - stable.  Continue home Losartan and Amlodipine    Restless Legs Syndrome - severe symptoms at baseline.  Continue home Requip and prn Clonazepam.    JONEL - continue home CPAP     CODE: Full  Diet/IVF: ADAT, NS  GI ppx:  omeprazole  DVT ppx: SCD    Raven Joiner MS PA-C  Physician Assistant   Hospitalist Service  Pager: [phone]           Chief Complaint:   Abdominal Pain         HPI:   72 year old female with a history of Renal Cell CA s/p right partial  nephrectomy, JONEL, Restless Legs Syndrome, Anxiety, Fibromyalgia and IBS who presents to the ED today 2/2 abdominal pain.    Patient reports abdominal cramping began 3/24 occurring in the umbilical area and diffuse lower quadrants with associated nausea and emesis.  She denies diarrhea, but reports soft stools daily.  She continued to have diffuse intermittent cramping and nausea with no further episodes of emesis.  She complained of bladder cramping with the sensation of incomplete bladder emptying and dysuria, so she presented to the ED on 3/26.  She did have a post void residual of about 150 ml and a vines catheter was inserted, but this did not result in any significant improvement in her discomfort. A CT did not show any evidence of an acute inflammatory condition within the abdomen, but noted fluid filled loops of small bowel which could represent enteritis. Also noted was an area of hypoattenuation within the uterus. Pain improved with IV Dilaudid and patient was discharged her with Percocet as needed, follow up with OB/GYN.  Patient has continued to have persistent intermittent abdominal cramping with nausea and poor oral intake with daily soft stools.  She has been afebrile.  Patient does report a hx of IBS with similar cramping in the past although never this severe.  She reports an intentional 10 pound weight loss over the past one month.           Past Medical History:     Past Medical History:   Diagnosis Date     Essential hypertension      Fibromyalgia      High cholesterol      History of blood transfusion      Intramural leiomyoma of uterus      Irritable bowel syndrome      Osteoarthrosis      Renal cell carcinoma     s/p partial right nephrectomy ([facility])     Sleep apnea     BI Pap          Past Surgical History:     Past Surgical History:   Procedure Laterality Date     BIOPSY BREAST SEED LOCALIZATION  12/11/2013    Procedure: BIOPSY BREAST SEED LOCALIZATION;  BIOPSY BREAST SEED LOCALIZATION  - LEFT  ;  Surgeon: Martha Hodges MD;  Location: [facility] OR     HC DILATION/CURETTAGE DIAG/THER NON OB      showed endometrial polyps     KNEE SURGERY      left      KNEE SURGERY      right      Miscarriage - D&C  1963     NEPHRECTOMY PARTIAL  2012    right          Social History:     Social History     Social History     Marital status:      Spouse name: N/A     Number of children: N/A     Years of education: N/A     Occupational History     Not on file.     Social History Main Topics     Smoking status: Never Smoker     Smokeless tobacco: Never Used     Alcohol use No     Drug use: No     Sexual activity: Not Currently     Other Topics Concern     Parent/Sibling W/ Cabg, Mi Or Angioplasty Before 65f 55m? No     Social History Narrative    .  .   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 Mother 74     MI, CABG     Hypertension Mother      Lipids Mother      Connective Tissue Disorder Mother      RA     OSTEOPOROSIS Mother      Gallbladder Disease Mother      Other - See Comments Mother      varicose veins      C.A.D. Father 49     MI, angioplasy     Cardiovascular Father 49     CEA     Neurologic Disorder Father      parkinsons     Lipids Father      Hypertension Father      CEREBROVASCULAR DISEASE Paternal Aunt           Allergies:      Allergies   Allergen Reactions     Gluten      Abdominal and back pain       Lyrica      Heart pounding and anxiety, loss of balance     Naproxen Nausea and Vomiting     No Clinical Screening - See Comments      Neoprene-contact rash     Penicillins      rash     Sulfa Drugs      anaphalactic shock age 1          Medications:     Prior to Admission medications    Medication Sig Last Dose Taking? Auth Provider   oxyCODONE-acetaminophen (PERCOCET) 5-325 MG per tablet Take 1-2 tablets by mouth every 4 hours as needed for pain 3/31/2017 at Unknown time Yes Van  Ivania Aguilera MD   clonazePAM (KLONOPIN) 0.5 MG tablet Take 0.5 tablets (0.25 mg) by mouth See Admin Instructions One-half po q hs.  May repeat dose in one hour if RLS persist. Past Week at Unknown time Yes Von Brizuela MD   rOPINIRole (REQUIP) 1 MG tablet Take 1 tablet (1 mg) by mouth 3 times daily 3/31/2017 at Unknown time Yes Tegan Bowden MD   losartan (COZAAR) 100 MG tablet Take 1 tablet (100 mg) by mouth daily 3/31/2017 at Unknown time Yes Tegan Bowden MD   omeprazole (PRILOSEC) 20 MG CR capsule Take 1 capsule (20 mg) by mouth daily Unknown at Unknown time  Nohemi Bella APRN CNP   meclizine (ANTIVERT) 25 MG tablet Take 1 tablet (25 mg) by mouth 3 times daily as needed for dizziness   Tegan Bowden MD   zolpidem (AMBIEN) 5 MG tablet Take 1 tablet (5 mg) by mouth nightly as needed for sleep   Tegan Bowden MD   amLODIPine (NORVASC) 5 MG tablet Take 1 tablet (5 mg) by mouth daily   Tegan Bowden MD   blood glucose monitoring (ACCU-CHEK FASTCLIX) lancets 1 each 2 times daily   Tegan Bowden MD   blood glucose monitoring (ACCU-CHEK SMARTVIEW) test strip Test 1 time a day.   Tegan Bowden MD   Cyanocobalamin (VITAMIN B-12 PO)    Reported, Patient   order for DME Equipment being ordered: Wrist splint   Tegan Bowden MD   order for DME Equipment being ordered: SADD lights-10,000 Lux   Tegan Bowden MD   Multiple Vitamin (CALCIUM COMPLEX OR) Take by mouth daily 2 tablets daily   Reported, Patient   magnesium gluconate (MAGONATE) 500 (27 MG) MG tablet Take 1 tablet (500 mg) by mouth 2 times daily   Tegan Bowden MD   UNKNOWN MED DOSAGE Mobysil cream-1 application prn joint pain   Tegan Bowden MD   Cholecalciferol (VITAMIN D) 2000 UNITS CAPS Take 2 tablets by mouth daily   Tegan Bowden MD   STATIN NOT PRESCRIBED, INTENTIONAL, Statin not prescribed  intentionally due to Intolerance   Tegan Bowden MD   ORDER FOR DME Equipment being ordered: Kathe Higuera MD   Probiotic Product (PROBIOTIC PO) Take 1 capsule by mouth daily as needed As needed   Reported, Patient          Review of Systems:   A complete ROS was performed and is negative other than what is stated in the HPI.       Physical Exam:   Blood pressure 124/59, temperature 98.7  F (37.1  C), temperature source Oral, resp. rate 18, SpO2 94 %, not currently breastfeeding.  General: Alert, interactive, NAD, lying in bed  HEENT: AT/NC, sclera anicteric, PERRL, EOMI  Chest/Resp: clear to auscultation bilaterally, no crackles or wheezes  Heart/CV: regular rate and rhythm, no murmur  Abdomen/GI: Soft, tender to moderate palpation of the umbilical area and diffuse bilateral lower quadrants with mild distension,. +BS.  No rebound.  Extremities/MSK: Trace BLE edema  Skin: Warm and dry, no jaundice or rash  Neuro: Alert & oriented x 3, Cns 2-12 intact, moves all extremities equally         Labs:   ROUTINE ICU LABS (Last four results)  CMP  Recent Labs  Lab 03/31/17  1705 03/30/17  1535 03/26/17  1250    141 142   POTASSIUM 3.7 3.6 4.0   CHLORIDE 105 106 107   CO2 29 27 26   ANIONGAP 6 8 9   * 112* 97   BUN 18 15 17   CR 0.87 0.73 0.73   GFRESTIMATED 64 78 78   GFRESTBLACK 78 >90African American GFR Calc >90African American GFR Calc   WILLIAM 8.8 8.8 8.5   PROTTOTAL 7.4 7.3 7.2   ALBUMIN 3.7 3.6 3.5   BILITOTAL 0.7 0.5 0.4   ALKPHOS 103 97 90   AST 21 22 17   ALT 32 33 24     CBC  Recent Labs  Lab 03/31/17  1705 03/30/17  1535 03/26/17  1250   WBC 8.2 6.7 6.9   RBC 3.69* 3.64* 3.84   HGB 11.0* 11.1* 11.7   HCT 34.4* 33.8* 35.4   MCV 93 93 92   MCH 29.8 30.5 30.5   MCHC 32.0 32.8 33.1   RDW 13.4 13.2 13.2    200 189     INRNo lab results found in last 7 days.  Arterial Blood GasNo lab results found in last 7 days.       Imaging/Procedures:     Results for orders placed or performed  during the hospital encounter of 03/31/17   CT Abdomen Pelvis w Contrast    Narrative    CT ABDOMEN PELVIS W CONTRAST 3/31/2017 8:26 PM    HISTORY: Abdominal pain.    TECHNIQUE: CT of the abdomen and pelvis is performed with 100mL  Isovue-370 intravenously. Radiation dose for this scan was reduced  using automated exposure control, adjustment of the mA and/or kV  according to patient size, or iterative reconstruction technique.    COMPARISON: March 26, 2017.    FINDINGS:    Liver: Normal.  Gallbladder:Normal.  Pancreas:Normal.  Spleen:Normal.  Adrenals:Normal.  Ascites:None.    Kidneys: Small bilateral renal cysts. No evidence of hydronephrosis.  Bladder:Normal.  Pelvic free fluid: Air-fluid level in the bladder.    Bowels: No evidence of obstruction.  Appendix: Not well visualized.    Abdominal or pelvic lymphadenopathy:None.    Miscellaneous findings: There is mild fat stranding throughout the  mesentery, similar in appearance to prior exam. Stable cystic  appearance of the uterus, as before.      Impression    IMPRESSION:    1. Mild diffuse fat stranding throughout the abdominal mesentery,  stable to slightly increased in prominence compared to prior exam.  This is nonspecific but could reflect a nonlocalized mesenteritis or  other diffuse inflammatory process.  2. The remainder the examination is otherwise stable.    MERCEDES AMANDA MD

## 2017-04-01 NOTE — PLAN OF CARE
Problem: Discharge Planning  Goal: Discharge Planning (Adult, OB, Behavioral, Peds)  Outcome: No Change  ROOM #227     Living Situation (if not independent, order SW consult): home with   Facility name: N/A     Activity level at baseline: independent  Activity level on admit: independent     Is patient a falls risk? Yes  Reason for falls risk: Medications  Falls armband on? Yes  Within Arm's Reach?  No, steady on feet and independent  Bed alarm turned on? No  Personal alarm in place and turned on? No, alert and oriented     Patient registered to observation; given Patient Bill of Rights; given the opportunity to ask questions about observation status and their plan of care.  Patient has been oriented to the observation room, bathroom and call light is in place.     : Chuck - [phone]

## 2017-04-01 NOTE — PLAN OF CARE
"Problem: Discharge Planning  Goal: Discharge Planning (Adult, OB, Behavioral, Peds)  Outcome: No Change  PRIMARY DIAGNOSIS: abdominal pain  Primary Symptoms:abdominal pain      OUTPATIENT/OBSERVATION GOALS TO BE MET BEFORE DISCHARGE:      1. Orthostatic symptoms (BP decrease or HR increase with patient upright)? N/A  2. Documented urine output: Yes  3. Tolerating PO fluid: Yes  4. Nausea/Vomiting/Diarrhea/ Abdominal Pain: (if present) symptoms improved: No. Abdominal pain continues. Pt states , \"I'm just not feeling well.\"      Pt c/o 5/10 (pain goal 5/10) diffuse abdominal, describing it as \"pressure\". Also c/o headache - declines any interventions for either complaint. Pt also c/o not being able to void. Bladder scanned for 280ml around midnight. Voided 250 ml about an hour later.   Pt tolerating clears and states she would like to order something gluten free for breakfast. Will advance diet. Will continue to monitor and provide supportive cares.          "

## 2017-04-03 NOTE — TELEPHONE ENCOUNTER
"Hospital/TCU/ED for chronic condition Discharge Protocol    \"Hi, my name is Hanna Coy, a registered nurse, and I am calling from [facility].  I am calling to follow up and see how things are going for you after your recent emergency visit/hospital/TCU stay.\"    Tell me how you are doing now that you are home?\" Doing well, having some mild abdominal cramping, the meds that were given are helping treat symptoms.       Discharge Instructions    \"Let's review your discharge instructions.  What is/are the follow-up recommendations?  Pt. Response: w/ PCP, follow up with ER if fever develops    \"Has an appointment with your primary care provider been scheduled?\"   We scheduled 4/11/17    \"When you see the provider, I would recommend that you bring your medications with you.\"    Medications    \"Tell me what changed about your medicines when you discharged?\"    Changes to chronic meds?    0-1    \"What questions do you have about your medications?\"    None     New diagnoses of heart failure, COPD, diabetes, or MI?    No              Medication reconciliation completed? Yes  Was MTM referral placed (*Make sure to put transitions as reason for referral)?   No    Call Summary    \"What questions or concerns do you have about your recent visit and your follow-up care?\"     none    \"If you have questions or things don't continue to improve, we encourage you contact us through the main clinic number (give number).  Even if the clinic is not open, triage nurses are available 24/7 to help you.     We would like you to know that our clinic has extended hours (provide information).  We also have urgent care (provide details on closest location and hours/contact info)\"      \"Thank you for your time and take care!\"             "

## 2017-04-03 NOTE — TELEPHONE ENCOUNTER
Please contact patient for In-patient follow up.  [phone] (home) none (work)    Visit date: 4/1/17  Diagnosis listed:Abdominal Pain, Generalized  Number of visits in past 12 months:0

## 2017-04-12 NOTE — NURSING NOTE
"Chief Complaint   Patient presents with     Hospital F/U     f/u for abdominal pain       Initial /64 (BP Location: Right arm, Patient Position: Chair, Cuff Size: Adult Large)  Pulse 76  Temp 99.1  F (37.3  C) (Tympanic)  Ht 5' 6\" (1.676 m)  Wt 204 lb 7 oz (92.7 kg)  SpO2 97%  BMI 33 kg/m2 Estimated body mass index is 33 kg/(m^2) as calculated from the following:    Height as of this encounter: 5' 6\" (1.676 m).    Weight as of this encounter: 204 lb 7 oz (92.7 kg).  Medication Reconciliation: complete   Lorrie MACIEJ Hatfield    "

## 2017-04-12 NOTE — LETTER
My Depression Action Plan  Name: Rosemarie Dolan   Date of Birth 1945  Date: 4/12/2017    My doctor: Tegan Bowden   My clinic: [address]  [address]  [facility] [address] [phone]          GREEN    ZONE   Good Control    What it looks like:     Things are going generally well. You have normal up s and down s. You may even feel depressed from time to time, but bad moods usually last less than a day.   What you need to do:  1. Continue to care for yourself (see self care plan)  2. Check your depression survival kit and update it as needed  3. Follow your physician s recommendations including any medication.  4. Do not stop taking medication unless you consult with your physician first.           YELLOW         ZONE Getting Worse    What it looks like:     Depression is starting to interfere with your life.     It may be hard to get out of bed; you may be starting to isolate yourself from others.    Symptoms of depression are starting to last most all day and this has happened for several days.     You may have suicidal thoughts but they are not constant.   What you need to do:     1. Call your care team, your response to treatment will improve if you keep your care team informed of your progress. Yellow periods are signs an adjustment may need to be made.     2. Continue your self-care, even if you have to fake it!    3. Talk to someone in your support network    4. Open up your depression survival kit           RED    ZONE Medical Alert - Get Help    What it looks like:     Depression is seriously interfering with your life.     You may experience these or other symptoms: You can t get out of bed most days, can t work or engage in other necessary activities, you have trouble taking care of basic hygiene, or basic responsibilities, thoughts of suicide or death that will not go away, self-injurious behavior.     What you need to  do:  1. Call your care team and request a same-day appointment. If they are not available (weekends or after hours) call your local crisis line, emergency room or 911.      Electronically signed by: Lorrie Hatfield, April 12, 2017    Depression Self Care Plan / Survival Kit    Self-Care for Depression  Here s the deal. Your body and mind are really not as separate as most people think.  What you do and think affects how you feel and how you feel influences what you do and think. This means if you do things that people who feel good do, it will help you feel better.  Sometimes this is all it takes.  There is also a place for medication and therapy depending on how severe your depression is, so be sure to consult with your medical provider and/ or Behavioral Health Consultant if your symptoms are worsening or not improving.     In order to better manage my stress, I will:    Exercise  Get some form of exercise, every day. This will help reduce pain and release endorphins, the  feel good  chemicals in your brain. This is almost as good as taking antidepressants!  This is not the same as joining a gym and then never going! (they count on that by the way ) It can be as simple as just going for a walk or doing some gardening, anything that will get you moving.      Hygiene   Maintain good hygiene (Get out of bed in the morning, Make your bed, Brush your teeth, Take a shower, and Get dressed like you were going to work, even if you are unemployed).  If your clothes don't fit try to get ones that do.    Diet  I will strive to eat foods that are good for me, drink plenty of water, and avoid excessive sugar, caffeine, alcohol, and other mood-altering substances.  Some foods that are helpful in depression are: complex carbohydrates, B vitamins, flaxseed, fish or fish oil, fresh fruits and vegetables.    Psychotherapy  I agree to participate in Individual Therapy (if recommended).    Medication  If prescribed medications, I agree  to take them.  Missing doses can result in serious side effects.  I understand that drinking alcohol, or other illicit drug use, may cause potential side effects.  I will not stop my medication abruptly without first discussing it with my provider.    Staying Connected With Others  I will stay in touch with my friends, family members, and my primary care provider/team.    Use your imagination  Be creative.  We all have a creative side; it doesn t matter if it s oil painting, sand castles, or mud pies! This will also kick up the endorphins.    Witness Beauty  (AKA stop and smell the roses) Take a look outside, even in mid-winter. Notice colors, textures. Watch the squirrels and birds.     Service to others  Be of service to others.  There is always someone else in need.  By helping others we can  get out of ourselves  and remember the really important things.  This also provides opportunities for practicing all the other parts of the program.    Humor  Laugh and be silly!  Adjust your TV habits for less news and crime-drama and more comedy.    Control your stress  Try breathing deep, massage therapy, biofeedback, and meditation. Find time to relax each day.     My support system    Clinic Contact:  Phone number:    Contact 1:  Phone number:    Contact 2:  Phone number:    Jew/:  Phone number:    Therapist:  Phone number:    Local crisis center:    Phone number:    Other community support:  Phone number:

## 2017-04-12 NOTE — MR AVS SNAPSHOT
After Visit Summary   4/12/2017    Rosemarie Dolan    MRN: [id number]           Patient Information     Date Of Birth          1945        Visit Information        Provider Department      4/12/2017 10:20 AM Tegan Bowden MD [facility]        Today's Diagnoses     Abdominal pain, generalized    -  1    Diarrhea, unspecified type        Abnormal CT scan, pelvis           Follow-ups after your visit        Your next 10 appointments already scheduled     Apr 14, 2017  9:40 AM CDT   (Arrive by 9:25 AM)   Return Visit with Von Brizuela MD   [facility] ([facility])    [address]  [address] [facility] [address]   [phone]            May 09, 2017  9:00 AM CDT   Office Visit with Tegan Bowden MD   [facility] ([facility])    [address]  [facility] [id number] [phone]           Bring a current list of meds and any records pertaining to this visit.  For Physicals, please bring immunization records and any forms needing to be filled out.  Please arrive 10 minutes early to complete paperwork.            Nov 30, 2017 10:00 AM CST   Return Sleep Patient with Aldo Longoria MD   [facility] ([facility])    [address] [place] [id number] [phone]              Future tests that were ordered for you today     Open Future Orders        Priority Expected Expires Ordered    Clostridium difficile Toxin B PCR Routine  5/12/2017 4/12/2017    Enteric Bacteria and Virus Panel by HAYDE Stool Routine  4/12/2018 4/12/2017            Who to contact     If you have questions or need follow up information about today's clinic visit or your schedule please contact [facility] directly at [phone].  Normal or non-critical lab and imaging results will be communicated to you by MyChart, letter or  "phone within 4 business days after the clinic has received the results. If you do not hear from us within 7 days, please contact the clinic through Revee or phone. If you have a critical or abnormal lab result, we will notify you by phone as soon as possible.  Submit refill requests through [employer] or call your pharmacy and they will forward the refill request to us. Please allow 3 business days for your refill to be completed.          Additional Information About Your Visit        Revee Information     Revee gives you secure access to your electronic health record. If you see a primary care provider, you can also send messages to your care team and make appointments. If you have questions, please call your primary care clinic.  If you do not have a primary care provider, please call [phone] and they will assist you.        Care EveryWhere ID     This is your Care EveryWhere ID. This could be used by other organizations to access your [place] medical records  [id number]        Your Vitals Were     Pulse Temperature Height Pulse Oximetry BMI (Body Mass Index)       76 99.1  F (37.3  C) (Tympanic) 5' 6\" (1.676 m) 97% 33 kg/m2        Blood Pressure from Last 3 Encounters:   04/12/17 132/64   04/01/17 124/44   03/30/17 132/64    Weight from Last 3 Encounters:   04/12/17 204 lb 7 oz (92.7 kg)   03/30/17 209 lb 6.4 oz (95 kg)   03/26/17 210 lb (95.3 kg)              We Performed the Following     DEPRESSION ACTION PLAN (DAP)          Today's Medication Changes          These changes are accurate as of: 4/12/17 11:14 AM.  If you have any questions, ask your nurse or doctor.               Stop taking these medicines if you haven't already. Please contact your care team if you have questions.     NORTRIPTYLINE HCL PO   Stopped by:  Tegan Bowden MD                Where to get your medicines      These medications were sent to [id number] - [place], [place] - [address], [place] [address]     Phone:  [phone]     dicyclomine 10 MG capsule         Some of these will need a paper prescription and others can be bought over the counter.  Ask your nurse if you have questions.     Bring a paper prescription for each of these medications     oxyCODONE-acetaminophen 5-325 MG per tablet                Primary Care Provider Office Phone # Fax #    Tegan Bowden [phone]       [address] [place] [address]        Thank you!     Thank you for choosing [facility]  for your care. Our goal is always to provide you with excellent care. Hearing back from our patients is one way we can continue to improve our services. Please take a few minutes to complete the written survey that you may receive in the mail after your visit with us. Thank you!             Your Updated Medication List - Protect others around you: Learn how to safely use, store and throw away your medicines at www.disposemymeds.org.          This list is accurate as of: 4/12/17 11:14 AM.  Always use your most recent med list.                   Brand Name Dispense Instructions for use    blood glucose monitoring lancets     1 Box    1 each 2 times daily       blood glucose monitoring test strip    ACCU-CHEK SMARTVIEW    1 Box    Test 1 time a day.       CALCIUM COMPLEX OR      Take by mouth daily 2 tablets daily       dicyclomine 10 MG capsule    BENTYL    120 capsule    Take 2 capsules (20 mg) by mouth 4 times daily as needed       losartan 100 MG tablet    COZAAR    90 tablet    Take 1 tablet (100 mg) by mouth daily       magnesium gluconate 500 (27 MG) MG tablet    MAGONATE     Take 1 tablet (500 mg) by mouth 2 times daily       MECLIZINE HCL PO      Take 12.5 mg by mouth 3 times daily as needed for dizziness       NORVASC PO      Take 5 mg by mouth every evening       ondansetron 4 MG ODT tab    ZOFRAN ODT    20 tablet    Take 1-2 tablets (4-8 mg)  by mouth 3 times daily (before meals)       oxyCODONE-acetaminophen 5-325 MG per tablet    PERCOCET    15 tablet    Take 1-2 tablets by mouth every 4 hours as needed for pain       PROBIOTIC PO      Take 1 capsule by mouth daily as needed As needed       rOPINIRole 1 MG tablet    REQUIP    270 tablet    Take 1 tablet (1 mg) by mouth 3 times daily       STATIN NOT PRESCRIBED (INTENTIONAL)     0 each    Statin not prescribed intentionally due to Intolerance       VITAMIN B-12 PO          vitamin D 2000 UNITS Caps     30 capsule    Take 2 tablets by mouth daily

## 2017-04-12 NOTE — PROGRESS NOTES
"  SUBJECTIVE:                                                    Rosemarie Dolan is a 72 year old female who presents to clinic today for the following health issues:          Hospital Follow-up Visit:    Hospital/Nursing Home/IP Rehab Facility: [facility]  Date of Admission: 3/31/2017  Date of Discharge: 4/1/2017  Reason(s) for Admission: abdominal pain            Problems taking medications regularly:  None       Medication changes since discharge: yes       Problems adhering to non-medication therapy:  None    Summary of hospitalization:  [facility] discharge summary reviewed  Diagnostic Tests/Treatments reviewed.  Follow up needed: uterine finding   Other Healthcare Providers Involved in Patient s Care:         None  Update since discharge: improved.     Post Discharge Medication Reconciliation: discharge medications reconciled, continue medications without change.  Plan of care communicated with patient     Coding guidelines for this visit:  Type of Medical   Decision Making Face-to-Face Visit       within 7 Days of discharge Face-to-Face Visit        within 14 days of discharge   Moderate Complexity [id number]   High Complexity [id number]          1) abdominal pain;  Better starting yesterday, was able to eat some and cramping has improved.  Yesterday had diarrhea about 4 x daily.  Having some stool incontinence.  While in hospital had not had bm in 2 days.  Has not been on abx, when on pain pills went \"several days\" without BM.  She thinks the bentyl was helping, did stop taking it because she was low on them.  Taking probiotics before she eats every time.  Did not take any pain pills yesterday, has about 4 pills left currently.  No further vomiting.  Appetite is increasing.  Does note some decreased energy.  Has lost 10 lbs with this illness.      2) irregularity in uterus: found on CT, increased from 3/2014.  Recommend follow up with pelvic ultrasound.  She notes this has been " evaluated by [place] with a biopsy in about 2014 that was negative.        Problem list and histories reviewed & adjusted, as indicated.  Additional history: as documented    Patient Active Problem List   Diagnosis     Fibromyalgia     Hyperlipidemia LDL goal <100     Renal cancer (H)     JONEL (obstructive sleep apnea)     Osteopenia     Chronic constipation     Obesity     Heart murmur     Advanced directives, counseling/discussion     Chronic pain     Restless legs syndrome (RLS)     Irritable bowel syndrome     Intermittent self-catheterization of bladder     Type 2 diabetes mellitus without complication (H)     Benign essential hypertension     Major depressive disorder, recurrent episode, mild (H)     Bladder prolapse, female, acquired     Prolapse of female pelvic organs     Anxiety     Past Surgical History:   Procedure Laterality Date     BIOPSY BREAST SEED LOCALIZATION  12/11/2013    Procedure: BIOPSY BREAST SEED LOCALIZATION;  BIOPSY BREAST SEED LOCALIZATION - LEFT  ;  Surgeon: Martha Hodges MD;  Location: [facility] OR     HC DILATION/CURETTAGE DIAG/THER NON OB  2000    showed endometrial polyps     KNEE SURGERY      left      KNEE SURGERY      right      Miscarriage - D&C  1963     NEPHRECTOMY PARTIAL  8/2012    right       Social History   Substance Use Topics     Smoking status: Never Smoker     Smokeless tobacco: Never Used     Alcohol use No     Family History   Problem Relation Age of Onset     C.A.D. Mother 74     MI, CABG     Hypertension Mother      Lipids Mother      Connective Tissue Disorder Mother      RA     OSTEOPOROSIS Mother      Gallbladder Disease Mother      Other - See Comments Mother      varicose veins      C.A.D. Father 49     MI, angioplasy     Cardiovascular Father 49     CEA     Neurologic Disorder Father      parkinsons     Lipids Father      Hypertension Father      CEREBROVASCULAR DISEASE Paternal Aunt            Reviewed and updated as needed this visit by clinical  "staff  Tobacco  Allergies  Meds  Med Hx  Surg Hx  Fam Hx  Soc Hx      Reviewed and updated as needed this visit by Provider         ROS:  Constitutional, HEENT, cardiovascular, pulmonary, gi and gu systems are negative, except as otherwise noted.    OBJECTIVE:                                                    /64 (BP Location: Right arm, Patient Position: Chair, Cuff Size: Adult Large)  Pulse 76  Temp 99.1  F (37.3  C) (Tympanic)  Ht 5' 6\" (1.676 m)  Wt 204 lb 7 oz (92.7 kg)  SpO2 97%  BMI 33 kg/m2  Body mass index is 33 kg/(m^2).  GENERAL: alert and no distress  EYES: Eyes grossly normal to inspection, PERRL and conjunctivae and sclerae normal  HENT: ear canals and TM's normal, nose and mouth without ulcers or lesions  NECK: no adenopathy, no asymmetry, masses  RESP: lungs clear to auscultation - no rales, rhonchi or wheezes  CV: regular rate and rhythm, normal S1 S2, no S3 or S4, no murmur, click or rub,  ABDOMEN: soft,  no hepatosplenomegaly, no masses and bowel sounds normal.  Mildly diffusely tender worse RLQ, no rebound or guarding     Diagnostic Test Results:  none      ASSESSMENT/PLAN:                                                    (R10.84) Abdominal pain, generalized  (primary encounter diagnosis)  -CT showed some enteritis  -per patient is improving, tolerating some po, pain is better  -as she is having diarrhea now will check cdiff and stool studies- if no diarrhea will not do stool studies  -will refill pain meds x 1, pt not sure she will fill the prescription  -pt to call if worsening or if not continuing to improve  Plan: dicyclomine (BENTYL) 10 MG capsule, Clostridium        difficile Toxin B PCR, Enteric Bacteria and         Virus Panel by HAYDE Stool,         oxyCODONE-acetaminophen (PERCOCET) 5-325 MG per        tablet          (R19.7) Diarrhea, unspecified type  -may simply be her usual IBS pattern, but recently hospitalized and some signs of enteritis on CT  -if diarrhea " resolves would not do stool studies  Plan: Clostridium difficile Toxin B PCR, Enteric         Bacteria and Virus Panel by HAYDE Stool      (R93.5) Abnormal CT scan, pelvis  -per patient this has been present and biopsied by gyn at [facility]  -will get these records  -would recommend f/u with gyn here to determine if any further work up is necessary   Plan: US Pelvic Complete with Transvaginal          FUTURE APPOINTMENTS:       - Follow-up for annual visit or as needed    Tegan Bowden MD  [facility]

## 2017-04-21 NOTE — TELEPHONE ENCOUNTER
LVM to call clinic. Please change 5-9 appt with Dr. Talbot to 10:15a if ok with patient. Appointment needs to be a long, 30 min appt.    Judy Swanson,   [facility]

## 2017-04-25 NOTE — TELEPHONE ENCOUNTER
The pain meds are likely worsening her constipation, and that may be increasing her abdominal pain.      I would recommend that she stop the oxycodone and take miralax to clean out her bowels, and see if that helps the pain.      If the pain persists over the next couple of days we will have her see gastroenterology.      I did print a script for pain meds, but would recommend she stop using them as it may be making things worse.     Tegan Bowden MD

## 2017-04-25 NOTE — TELEPHONE ENCOUNTER
Gave pt msg below and she is aware. Pt said she tried and stopped  the pain med on Sunday but pain was unbearable. Pt would like to  Rx at . Pt would like referral to gastroenterology.    Lorrie Hatfield CMA

## 2017-04-25 NOTE — TELEPHONE ENCOUNTER
"Patient calling in follow-up to prior visit for generalized abdominal pain.  Has to take two Oxycodone daily to be able to deal with the pain.  Reports that she hasn't been taking the Bentyl on a regular basis.  Has not taken today, and unsure if she took the Bentyl yesterday.  Abdominal spasms have lessened.  Nothing she eats agrees with her.  Now she has \"bad pain in my whole abdomen\".  Patient feels she is getting constipated.  Is passing small amount of stool that is very formed and hard.  Passing a small amount of food each day.  Is trying to drink fluids.  No vomiting.  Feels nauseous.  No fever.  Requesting a refill of pain medication.  Patient only has two Oxycodone left.    Oxydodone 5-325 mg      Last Written Prescription Date:  04/12/17  Last Fill Quantity: 15,   # refills: 0  Last Office Visit with [facility], [facility] or [facility] prescribing provider: 04/12/17  Future Office visit:    Next 5 appointments (look out 90 days)     May 09, 2017  9:00 AM CDT   Office Visit with Tegan Bowden MD   [facility]    [address] [place] [address]  [address]  [facility] [address]   [phone]            May 09, 2017 10:15 AM CDT   SHORT with Miguel A Talbot MD   [facility] ([facility])    [address] [place] [address]  [address]  [facility] [address]   [phone]                   Routing refill request to provider for review/approval because:  Drug not on the [facility], [facility] or [facility] refill protocol or controlled substance    Controlled Substance Refill Request for Oxycodone  Problem List Complete:  Yes   checked in past 6 months?  Yes 04/25/17   Oxycodone filled for Qty of 14 on 03/26 and 04/01 (ER visits), and lastly on 04/12/17  ANNETTE Sylvester RN      "

## 2017-05-09 NOTE — MR AVS SNAPSHOT
After Visit Summary   5/9/2017    Rosemarie Dolan    MRN: [id number]           Patient Information     Date Of Birth          1945        Visit Information        Provider Department      5/9/2017 10:15 AM Miguel A Talbot MD [facility] [place]        Today's Diagnoses     Postmenopausal bleeding    -  1       Follow-ups after your visit        Follow-up notes from your care team     Return if symptoms worsen or fail to improve.      Your next 10 appointments already scheduled     May 17, 2017  2:30 PM CDT   CTA ANGIOGRAM ABDOMEN with [address] [facility] ([facility])    [address]  [facility] [address] [phone]           Please bring any scans or X-rays taken at other hospitals, if similar tests were done. Also bring a list of your medicines, including vitamins, minerals and over-the-counter drugs. It is safest to leave personal items at home.  Be sure to tell your doctor:   If you have any allergies.   If there s any chance you are pregnant.   If you are breastfeeding.   If you have any special needs.  You will have contrast for this exam. To prepare:   Do not eat or drink for 2 hours before your exam. If you need to take medicine, you may take it with small sips of water. (We may ask you to take liquid medicine as well.)   The day before your exam, drink extra fluids at least six 8-ounce glasses (unless your doctor tells you to restrict your fluids).  Patients over 70 or patients with diabetes or kidney problems:   If you haven t had a blood test (creatinine test) within the last 30 days, go to your clinic or Diagnostic Imaging Department for this test.  If you have diabetes:   If your kidney function is normal, continue taking your metformin (Avandamet, Glucophage, Glucovance, Metaglip) on the day of your exam.   If your kidney function is abnormal, wait 48 hours before restarting this medicine.  Please wear  loose clothing, such as a sweat suit or jogging clothes. Avoid snaps, zippers and other metal. We may ask you to undress and put on a hospital gown.  If you have any questions, please call the Imaging Department where you will have your exam.            Nov 30, 2017 10:00 AM CST   Return Sleep Patient with Aldo Longoria MD   [facility]    [address] [place] [id number]   [phone]              Future tests that were ordered for you today     Open Future Orders        Priority Expected Expires Ordered    CTA Angiogram Abdomen Routine  5/15/2018 5/15/2017            Who to contact     If you have questions or need follow up information about today's clinic visit or your schedule please contact [facility] directly at [phone].  Normal or non-critical lab and imaging results will be communicated to you by MyChart, letter or phone within 4 business days after the clinic has received the results. If you do not hear from us within 7 days, please contact the clinic through [employer] or phone. If you have a critical or abnormal lab result, we will notify you by phone as soon as possible.  Submit refill requests through [employer] or call your pharmacy and they will forward the refill request to us. Please allow 3 business days for your refill to be completed.          Additional Information About Your Visit        MyChart Information     [employer] gives you secure access to your electronic health record. If you see a primary care provider, you can also send messages to your care team and make appointments. If you have questions, please call your primary care clinic.  If you do not have a primary care provider, please call [phone] and they will assist you.        Care EveryWhere ID     This is your Care EveryWhere ID. This could be used by other organizations to access your [place] medical records  [id number]        Your Vitals  Were     Pulse BMI (Body Mass Index)                84 32.44 kg/m2           Blood Pressure from Last 3 Encounters:   05/04/17 140/72   05/09/17 140/72   04/12/17 132/64    Weight from Last 3 Encounters:   05/04/17 201 lb (91.2 kg)   05/09/17 201 lb (91.2 kg)   04/12/17 204 lb 7 oz (92.7 kg)              Today, you had the following     No orders found for display         Where to get your medicines      Some of these will need a paper prescription and others can be bought over the counter.  Ask your nurse if you have questions.     Bring a paper prescription for each of these medications     oxyCODONE-acetaminophen 5-325 MG per tablet          Primary Care Provider Office Phone # Fax #    Tegan Bowden [phone]       [address] [place] [address]        Thank you!     Thank you for choosing [facility]  for your care. Our goal is always to provide you with excellent care. Hearing back from our patients is one way we can continue to improve our services. Please take a few minutes to complete the written survey that you may receive in the mail after your visit with us. Thank you!             Your Updated Medication List - Protect others around you: Learn how to safely use, store and throw away your medicines at www.disposemymeds.org.          This list is accurate as of: 5/9/17 11:59 PM.  Always use your most recent med list.                   Brand Name Dispense Instructions for use    blood glucose monitoring lancets     1 Box    1 each 2 times daily       blood glucose monitoring test strip    ACCU-CHEK SMARTVIEW    1 Box    Test 1 time a day.       CALCIUM COMPLEX OR      Take by mouth daily 2 tablets daily       dicyclomine 10 MG capsule    BENTYL    120 capsule    Take 2 capsules (20 mg) by mouth 4 times daily as needed       PISANO SEAL PO      Take by mouth daily       losartan 100 MG tablet    COZAAR    90 tablet    Take 1 tablet  (100 mg) by mouth daily       magnesium gluconate 500 (27 MG) MG tablet    MAGONATE     Take 1 tablet (500 mg) by mouth 2 times daily       MECLIZINE HCL PO      Take 12.5 mg by mouth 3 times daily as needed for dizziness       NORVASC PO      Take 5 mg by mouth every evening       ondansetron 4 MG ODT tab    ZOFRAN ODT    20 tablet    Take 1-2 tablets (4-8 mg) by mouth 3 times daily (before meals)       oxyCODONE-acetaminophen 5-325 MG per tablet    PERCOCET    15 tablet    Take 1-2 tablets by mouth every 4 hours as needed for pain       PROBIOTIC PO      Take 1 capsule by mouth daily as needed As needed       rOPINIRole 1 MG tablet    REQUIP    270 tablet    Take 1 tablet (1 mg) by mouth 3 times daily       STATIN NOT PRESCRIBED (INTENTIONAL)     0 each    Statin not prescribed intentionally due to Intolerance       VITAMIN B-12 PO          vitamin D 2000 UNITS Caps     30 capsule    Take 2 tablets by mouth daily

## 2017-05-09 NOTE — NURSING NOTE
"Chief Complaint   Patient presents with     Wellness Visit     Diabetes       Initial /72 (BP Location: Right arm, Patient Position: Chair, Cuff Size: Adult Large)  Pulse 84  Temp 99.7  F (37.6  C) (Tympanic)  Ht 5' 6\" (1.676 m)  Wt 201 lb (91.2 kg)  BMI 32.44 kg/m2 Estimated body mass index is 32.44 kg/(m^2) as calculated from the following:    Height as of this encounter: 5' 6\" (1.676 m).    Weight as of this encounter: 201 lb (91.2 kg).  Medication Reconciliation: complete   Lorrie Hatfield CMA    "

## 2017-05-09 NOTE — NURSING NOTE
I called [facility] to facilitate an appointment for patient to consult for enteritis and severe abdominal pain.  I spoke with Roselyn, and she will call patient to schedule an appointment.   She will try to schedule her within the next couple of days.  I have asked her to call me back with the appointment date.  ANNETTE Sylvester RN

## 2017-05-09 NOTE — NURSING NOTE
"Chief Complaint   Patient presents with     RECHECK     US done 05/04/17 - thickened endometrium - hx of EMB 3 years ago - has been having abdominal pain since March-April       Initial /72 (BP Location: Right arm, Patient Position: Chair, Cuff Size: Adult Large)  Pulse 84  Wt 201 lb (91.2 kg)  BMI 32.44 kg/m2 Estimated body mass index is 32.44 kg/(m^2) as calculated from the following:    Height as of 5/4/17: 5' 6\" (1.676 m).    Weight as of this encounter: 201 lb (91.2 kg).  Medication Reconciliation: complete     Nurse assisted visit.  Hanna Rodriguez MA.      "

## 2017-05-09 NOTE — PROGRESS NOTES
"  SUBJECTIVE:                                                              Please note:  Pt was originally scheduled for annual wellness visit but converted to acute visit due to severity of acute symptoms.     Rosemarie Dolan is a 72 year old female who presents for ill visit    Concerns: recheck hemoglobin    Pt notes that she thinks she is \"dying slowly\".  She feels the abdominal pain is severe, and \"I can't take it any more\". She describes the pain as cramping \"will double me over\" She notes that gas pains are better since starting mahmood seal.  Cannot eat very well, after eating has significant abdominal pain through to her back, then her \"nerve pain\" starts in and \"everything escalates\".  Feels that her mind is cloudy and she can't think.  She's having a hard time doing things in and out of the house.  She notes she feels weak most the time because she only eats one meal a day due to the pain.  She feels nauseated most the time, and is afraid the pain will hurt worse so she doesn't eat.  Is not sleeping due to the pain.  Does not have an appointment with gastroenterology.  A week ago she had severe diarrhea, had 8 stools.  Has only had one stool since then.  Is sweating all night, not soaking clothes or sheets.  Has lost 3 lbs in the past month, and 18 lbs since this started.       Is taking oxycodone 1-2 pills daily most days (one in am and one at night).  Oxycodone sometimes makes the pain better.      Was seen in the ER on 3/31, kept in obs overnight.  CBC showed normal WBC and anemia with hgb of 9.4, which had steadily declined over the day in the hospital with IVF.  CRP elevated at 77, ESR elevated at 52, lipase and lactate normal.  CT showed mesenteric fat stranding, not localized to one particular area, thought to be non localized mesenteritis or other diffuse inflammatory process. She was placed on PO pain medications and bentyl and discharged.  I saw her in clinic 10 days later and she felt she was " "starting to improve with less vomiting and pain.  She was to contact me if she was not continuing to improve, and scheduled this appointment today.  She feels she has not significantly improved since the hospitalization, and in fact is gradually worsening.        Reviewed and updated as needed this visit by clinical staff  Tobacco  Allergies  Meds  Med Hx         Reviewed and updated as needed this visit by Provider        Social History   Substance Use Topics     Smoking status: Never Smoker     Smokeless tobacco: Never Used     Alcohol use No       The patient does not drink >3 drinks per day nor >7 drinks per week.    Today's PHQ-2 Score:   PHQ-2 ( 1999 Pf[employer]) 1/25/2017 12/21/2016   Q1: Little interest or pleasure in doing things 3 0   Q2: Feeling down, depressed or hopeless 1 0   PHQ-2 Score 4 0       Do you feel safe in your environment - YES    Do you have a Health Care Directive?: Yes: Advance Directive has been received and scanned.    Current providers sharing in care for this patient include:   Patient Care Team:  Tegan Bowden MD as PCP - General (Pediatrics)  Yee Reagan RN as Nurse Coordinator (Neurology)      Hearing impairment: No    Ability to successfully perform activities of daily living: Yes, no assistance needed     Fall risk:       Home safety:  none identified        ROS:  Constitutional, HEENT, cardiovascular, pulmonary, gi and gu systems are negative, except as otherwise noted.    Problem list, Medication list, Allergies, and Medical/Social/Surgical histories reviewed in EPIC and updated as appropriate.  OBJECTIVE:                                                            /72 (BP Location: Right arm, Patient Position: Chair, Cuff Size: Adult Large)  Pulse 84  Temp 99.7  F (37.6  C) (Tympanic)  Ht 5' 6\" (1.676 m)  Wt 201 lb (91.2 kg)  BMI 32.44 kg/m2 Estimated body mass index is 32.44 kg/(m^2) as calculated from the following:    Height as of this " "encounter: 5' 6\" (1.676 m).    Weight as of this encounter: 201 lb (91.2 kg).  EXAM:   GENERAL: alert and moderate distress due to pain  EYES: Eyes grossly normal to inspection, PERRL and conjunctivae and sclerae normal  NECK: no adenopathy, no asymmetry, masses,  Mouth:  Mucosa slightly dry.    RESP: lungs clear to auscultation - no rales, rhonchi or wheezes  CV: regular rate and rhythm, normal S1 S2, no S3 or S4, no murmur, click or rub,   ABDOMEN: soft,  no hepatosplenomegaly, no masses and bowel sounds normal.  Tender epigastric, RUQ and LUQ.  Some voluntary guarding, no rebound.  States it is tender when I palpate the rest of her abdomen.   MS: no gross musculoskeletal defects noted, no edema    ASSESSMENT / PLAN:                                                            (R10.84) Abdominal pain, generalized  (primary encounter diagnosis)  -pt has had an extensive work up revealing \"possible enteritis\" with elevated CRP, ESR, and anemia. DDX includes IBD or other causes of enteritis, although she is not having diarrhea but rather alternating bouts of constipation and diarrhea. Will refer to GI, suspect she will need EGD and colon, would like gastro consult to evaluate best next steps for this patient.   -will repeat labs today to look for worsening of labs, new lab abnormalities. See below for labs ordered; all ordered stat so should be back later today  -did refill pain medication #15, although if this is IBS may be making things worse  -offered to repeat CT scan today; however patient refused wanting to wait for GI appointment.    Plan: oxyCODONE-acetaminophen (PERCOCET) 5-325 MG per        tablet, CBC with platelets differential,         Erythrocyte sedimentation rate auto, CRP         inflammation, Lipase, Amylase, Comprehensive         metabolic panel        (E11.9) Type 2 diabetes mellitus without complication, without long-term current use of insulin (H)  -will do A1 with other labs today     (M79.7) " Fibromyalgia  -pt with chronic pain; however this seems worse than her typical chronic pain     (I10) Benign essential hypertension  -bp elevated today but in pain, will monitor in the future        Tegan Bowden MD  [facility]

## 2017-05-09 NOTE — MR AVS SNAPSHOT
After Visit Summary   5/9/2017    Rosemarie Dolan    MRN: [id number]           Patient Information     Date Of Birth          1945        Visit Information        Provider Department      5/9/2017 11:20 AM Tegan Bowden MD [facility]        Today's Diagnoses     Routine general medical examination at a health care facility    -  1    Abdominal pain, generalized        Type 2 diabetes mellitus without complication, without long-term current use of insulin (H)        Fibromyalgia        Benign essential hypertension          Care Instructions      Preventive Health Recommendations    Female Ages 65 +    Yearly exam:     See your health care provider every year in order to  o Review health changes.   o Discuss preventive care.    o Review your medicines if your doctor has prescribed any.      You no longer need a yearly Pap test unless you've had an abnormal Pap test in the past 10 years. If you have vaginal symptoms, such as bleeding or discharge, be sure to talk with your provider about a Pap test.      Every 1 to 2 years, have a mammogram.  If you are over 69, talk with your health care provider about whether or not you want to continue having screening mammograms.      Every 10 years, have a colonoscopy. Or, have a yearly FIT test (stool test). These exams will check for colon cancer.       Have a cholesterol test every 5 years, or more often if your doctor advises it.       Have a diabetes test (fasting glucose) every three years. If you are at risk for diabetes, you should have this test more often.       At age 65, have a bone density scan (DEXA) to check for osteoporosis (brittle bone disease).    Shots:    Get a flu shot each year.    Get a tetanus shot every 10 years.    Talk to your doctor about your pneumonia vaccines. There are now two you should receive - Pneumovax (PPSV 23) and Prevnar (PCV 13).    Talk to your doctor about the shingles vaccine.    Talk to  your doctor about the hepatitis B vaccine.    Nutrition:     Eat at least 5 servings of fruits and vegetables each day.      Eat whole-grain bread, whole-wheat pasta and brown rice instead of white grains and rice.      Talk to your provider about Calcium and Vitamin D.     Lifestyle    Exercise at least 150 minutes a week (30 minutes a day, 5 days a week). This will help you control your weight and prevent disease.      Limit alcohol to one drink per day.      No smoking.       Wear sunscreen to prevent skin cancer.       See your dentist twice a year for an exam and cleaning.      See your eye doctor every 1 to 2 years to screen for conditions such as glaucoma, macular degeneration and cataracts.        Follow-ups after your visit        Your next 10 appointments already scheduled     Nov 30, 2017 10:00 AM CST   Return Sleep Patient with Aldo Longoria MD   [facility] ([facility])    [address] [place] [id number] [phone]              Who to contact     If you have questions or need follow up information about today's clinic visit or your schedule please contact [facility] directly at [phone].  Normal or non-critical lab and imaging results will be communicated to you by MyChart, letter or phone within 4 business days after the clinic has received the results. If you do not hear from us within 7 days, please contact the clinic through [employer] or phone. If you have a critical or abnormal lab result, we will notify you by phone as soon as possible.  Submit refill requests through [employer] or call your pharmacy and they will forward the refill request to us. Please allow 3 business days for your refill to be completed.          Additional Information About Your Visit        [employer] Information     [employer] gives you secure access to your electronic health record. If you see a primary care provider, you can also send messages to your  "care team and make appointments. If you have questions, please call your primary care clinic.  If you do not have a primary care provider, please call [phone] and they will assist you.        Care EveryWhere ID     This is your Care EveryWhere ID. This could be used by other organizations to access your [facility] medical records  [id number]        Your Vitals Were     Pulse Temperature Height BMI (Body Mass Index)          84 99.7  F (37.6  C) (Tympanic) 5' 6\" (1.676 m) 32.44 kg/m2         Blood Pressure from Last 3 Encounters:   05/04/17 140/72   05/09/17 140/72   04/12/17 132/64    Weight from Last 3 Encounters:   05/04/17 201 lb (91.2 kg)   05/09/17 201 lb (91.2 kg)   04/12/17 204 lb 7 oz (92.7 kg)              We Performed the Following     Amylase     CBC with platelets differential     Comprehensive metabolic panel     CRP inflammation     Erythrocyte sedimentation rate auto     Hemoglobin A1c     Lipase          Where to get your medicines      Some of these will need a paper prescription and others can be bought over the counter.  Ask your nurse if you have questions.     Bring a paper prescription for each of these medications     oxyCODONE-acetaminophen 5-325 MG per tablet          Primary Care Provider Office Phone # Fax #    Tegan Bowden [phone]       [address] [place] [address]        Thank you!     Thank you for choosing [facility]  for your care. Our goal is always to provide you with excellent care. Hearing back from our patients is one way we can continue to improve our services. Please take a few minutes to complete the written survey that you may receive in the mail after your visit with us. Thank you!             Your Updated Medication List - Protect others around you: Learn how to safely use, store and throw away your medicines at www.disposemymeds.org.          This list is accurate as of: 5/9/17 12:19 " PM.  Always use your most recent med list.                   Brand Name Dispense Instructions for use    blood glucose monitoring lancets     1 Box    1 each 2 times daily       blood glucose monitoring test strip    ACCU-CHEK SMARTVIEW    1 Box    Test 1 time a day.       CALCIUM COMPLEX OR      Take by mouth daily 2 tablets daily       dicyclomine 10 MG capsule    BENTYL    120 capsule    Take 2 capsules (20 mg) by mouth 4 times daily as needed       PISANO SEAL PO      Take by mouth daily       losartan 100 MG tablet    COZAAR    90 tablet    Take 1 tablet (100 mg) by mouth daily       magnesium gluconate 500 (27 MG) MG tablet    MAGONATE     Take 1 tablet (500 mg) by mouth 2 times daily       MECLIZINE HCL PO      Take 12.5 mg by mouth 3 times daily as needed for dizziness       NORVASC PO      Take 5 mg by mouth every evening       ondansetron 4 MG ODT tab    ZOFRAN ODT    20 tablet    Take 1-2 tablets (4-8 mg) by mouth 3 times daily (before meals)       oxyCODONE-acetaminophen 5-325 MG per tablet    PERCOCET    15 tablet    Take 1-2 tablets by mouth every 4 hours as needed for pain       PROBIOTIC PO      Take 1 capsule by mouth daily as needed As needed       rOPINIRole 1 MG tablet    REQUIP    270 tablet    Take 1 tablet (1 mg) by mouth 3 times daily       STATIN NOT PRESCRIBED (INTENTIONAL)     0 each    Statin not prescribed intentionally due to Intolerance       VITAMIN B-12 PO          vitamin D 2000 UNITS Caps     30 capsule    Take 2 tablets by mouth daily

## 2017-05-15 NOTE — PROGRESS NOTES
SUBJECTIVE:  Rosemarie Dolan is an 72 year old  P:2 postmenopausal woman who presents for evaluation of abnormal pelvic ultrasound; prominent endometrium         Not on HRT    Has noted intermittent vaginal bleeding    EMB-about 3 years previous (by history) negative    Past Medical History:   Diagnosis Date     Essential hypertension      Fibromyalgia      High cholesterol      History of blood transfusion      Intramural leiomyoma of uterus      Irritable bowel syndrome      Osteoarthrosis      Renal cell carcinoma     s/p partial right nephrectomy ([facility])     Sleep apnea     BI Pap       Family History   Problem Relation Age of Onset     C.A.D. Mother 74     MI, CABG     Hypertension Mother      Lipids Mother      Connective Tissue Disorder Mother      RA     OSTEOPOROSIS Mother      Gallbladder Disease Mother      Other - See Comments Mother      varicose veins      C.A.D. Father 49     MI, angioplasy     Cardiovascular Father 49     CEA     Neurologic Disorder Father      parkinsons     Lipids Father      Hypertension Father      CEREBROVASCULAR DISEASE Paternal Aunt        Past Surgical History:   Procedure Laterality Date     BIOPSY BREAST SEED LOCALIZATION  2013    Procedure: BIOPSY BREAST SEED LOCALIZATION;  BIOPSY BREAST SEED LOCALIZATION - LEFT  ;  Surgeon: Martha Hodges MD;  Location: [facility] OR     HC DILATION/CURETTAGE DIAG/THER NON OB      showed endometrial polyps     KNEE SURGERY      left      KNEE SURGERY      right      Miscarriage - D&C  1963     NEPHRECTOMY PARTIAL  2012    right       Current Outpatient Prescriptions   Medication     dicyclomine (BENTYL) 10 MG capsule     AmLODIPine Besylate (NORVASC PO)     rOPINIRole (REQUIP) 1 MG tablet     losartan (COZAAR) 100 MG tablet     Cyanocobalamin (VITAMIN B-12 PO)     Multiple Vitamin (CALCIUM COMPLEX OR)     magnesium gluconate (MAGONATE) 500 (27 MG) MG tablet     Cholecalciferol (VITAMIN D) 2000 UNITS CAPS      Probiotic Product (PROBIOTIC PO)     PISANO SEAL PO     oxyCODONE-acetaminophen (PERCOCET) 5-325 MG per tablet     MECLIZINE HCL PO     ondansetron (ZOFRAN ODT) 4 MG ODT tab     blood glucose monitoring (ACCU-CHEK FASTCLIX) lancets     blood glucose monitoring (ACCU-CHEK SMARTVIEW) test strip     STATIN NOT PRESCRIBED, INTENTIONAL,     No current facility-administered medications for this visit.      Allergies   Allergen Reactions     Gluten      Abdominal and back pain       Lyrica      Heart pounding and anxiety, loss of balance     Naproxen Nausea and Vomiting     No Clinical Screening - See Comments      Neoprene-contact rash     Penicillins      rash     Sulfa Drugs      anaphalactic shock age 1       Social History   Substance Use Topics     Smoking status: Never Smoker     Smokeless tobacco: Never Used     Alcohol use No       ROS:  C: NEGATIVE for fever, chills  E: NEGATIVE for vision changes   R: NEGATIVE for significant cough or SOB  CV: NEGATIVE for chest pain, palpitations   GI: NEGATIVE for nausea, abdominal pain, heartburn, or change in bowel habits  : see above    OBJECTIVE:  Exam:  GENERAL APPEARANCE: healthy, alert and no distress  ABDOMEN:  soft, nontender, no HSM or masses and bowel sounds normal        ASSESSMENT/PLAN:  Postmenopausal bleeding; with abnormal endometrium sonographically     -concern re: malignancy or abnormal endometrium  Despite clear indications re: recommend of endometrial evaluation (EMB or D&C)     -patient declines at this visit    15 minutes spent reviewing differential diagnosis; methods of evaluation and need      -to proceed with evaluation      Health Maintenance   Topic Date Due     EYE EXAM Q1 YEAR( NO INBASKET)  02/01/2017     LIPID MONITORING Q1 YEAR( NO INBASKET)  03/22/2017     MICROALBUMIN Q1 YEAR( NO INBASKET)  03/22/2017     INFLUENZA VACCINE (SYSTEM ASSIGNED)  09/01/2017     FOOT EXAM Q1 YEAR( NO INBASKET)  11/08/2017     URINE DRUG SCREEN Q1 YR  11/08/2017      A1C Q6 MO( NO INBASKET)  11/09/2017     PHQ-9 Q6 MONTHS (NO INBASKET)  11/09/2017     NATE QUESTIONNAIRE 1 YEAR  12/21/2017     MAMMO SCREEN Q2 YR (SYSTEM ASSIGNED)  04/01/2018     FALL RISK ASSESSMENT  04/12/2018     PNEUMOCOCCAL (2 of 2 - PPSV23) 04/12/2018     DEPRESSION ACTION PLAN Q1 YR (NO INBASKET)  04/12/2018     CMP Q1 YR (NO INBASKET)  05/09/2018     DEXA Q5 YR  10/17/2018     TSH W/ FREE T4 REFLEX Q2 YEAR (NO INBASKET)  12/18/2018     ADVANCE DIRECTIVE PLANNING Q5 YRS (NO INBASKET)  04/21/2020     TETANUS IMMUNIZATION (SYSTEM ASSIGNED)  04/20/2022     COLON CANCER SCREEN (SYSTEM ASSIGNED)  05/09/2024

## 2017-05-17 NOTE — TELEPHONE ENCOUNTER
Please let patient know she has an appointment scheduled Friday 5/19 in [place] at 10:15am with Dr. Talbot.    Judy Swanson,   [facility]

## 2017-05-17 NOTE — TELEPHONE ENCOUNTER
I'd like to see notes from GI visit.  I only have the lab results, not the clinic note.    Tegan Bowden MD

## 2017-05-17 NOTE — TELEPHONE ENCOUNTER
Percocet      Last Written Prescription Date:  5/9/17  Last Fill Quantity: 15,   # refills: 0    States she takes one every 12 hours. Saw GI and is scheduled for a CT angiogram today.    Last Office Visit with [facility], P or  Health prescribing provider: 5/9/17  Future Office visit:       Routing refill request to provider for review/approval because:  Drug not on the [facility], [facility] or [facility] refill protocol or controlled substance.  Please leave at the FD and call patient when ready.  Celine Sweeney RN  Message handled by Nurse Triage.

## 2017-05-18 NOTE — TELEPHONE ENCOUNTER
He is not available today.  Friday is the earliest she can have it done, and he is overbooking her to do it, which another provider wouldn't do.  One day is not going to change diagnosis or treatment outcomes.   Tegan Bowden MD

## 2017-05-18 NOTE — TELEPHONE ENCOUNTER
"Patient notified last night that she has \"abdominal cancer\"  Is scheduled for an endometrial biopsy on Friday with Dr. Talbot.  Her daughter would like her to get this done today, if possible.  Patient thought you had spoken with Dr Talbot..  Patient is concerned that if she doesn't have this done today, she won't get biopsy results back until Monday or Tuesday.  ANNETTE Sylvester RN    "

## 2017-05-19 NOTE — NURSING NOTE
"Chief Complaint   Patient presents with     Minor Procedure     Endometrial biopsy     Pelvic Pressure       Initial /68 (BP Location: Right arm, Patient Position: Chair, Cuff Size: Adult Regular)  Temp 98.4  F (36.9  C) (Oral)  Ht 5' 6\" (1.676 m)  Wt 202 lb (91.6 kg)  BMI 32.6 kg/m2 Estimated body mass index is 32.6 kg/(m^2) as calculated from the following:    Height as of this encounter: 5' 6\" (1.676 m).    Weight as of this encounter: 202 lb (91.6 kg).  BP completed using cuff size: regular        The following HM Due: NONE  No results found for: PAP age 72      The following patient reported/Care Every where data was sent to:  P ABSTRACT QUALITY INITIATIVES [[id number]]       patient has appointment for today             "

## 2017-05-19 NOTE — PROGRESS NOTES
SUBJECTIVE: Rosemarie Dolan is a 72 year old female  female. OB No LMP recorded. Patient is postmenopausal..  She is here for endometrial biopsy. The details of EMB were reviewed, as well as the risks of pain, infection and bleeding. Risks also include risk of uterine perforation, PID and possible pregnancy and risk of failure to remove all abnormality and/or recurrence of condition.  All questions were answered before proceeding, and informed consent was therefore obtained.    Allergies   Allergen Reactions     Gluten      Abdominal and back pain       Lyrica      Heart pounding and anxiety, loss of balance     Naproxen Nausea and Vomiting     No Clinical Screening - See Comments      Neoprene-contact rash     Penicillins      rash     Sulfa Drugs      anaphalactic shock age 1     Current Outpatient Prescriptions   Medication Sig Dispense Refill     oxyCODONE-acetaminophen (PERCOCET) 5-325 MG per tablet Take 1-2 tablets by mouth every 4 hours as needed for pain 30 tablet 0     PISANO SEAL PO Take by mouth daily       dicyclomine (BENTYL) 10 MG capsule Take 2 capsules (20 mg) by mouth 4 times daily as needed 120 capsule 1     AmLODIPine Besylate (NORVASC PO) Take 5 mg by mouth every evening       MECLIZINE HCL PO Take 12.5 mg by mouth 3 times daily as needed for dizziness       rOPINIRole (REQUIP) 1 MG tablet Take 1 tablet (1 mg) by mouth 3 times daily 270 tablet 3     blood glucose monitoring (ACCU-CHEK SMARTVIEW) test strip Test 1 time a day. 1 Box 11     Cyanocobalamin (VITAMIN B-12 PO)        Multiple Vitamin (CALCIUM COMPLEX OR) Take by mouth daily 2 tablets daily       magnesium gluconate (MAGONATE) 500 (27 MG) MG tablet Take 1 tablet (500 mg) by mouth 2 times daily       Cholecalciferol (VITAMIN D) 2000 UNITS CAPS Take 2 tablets by mouth daily 30 capsule      STATIN NOT PRESCRIBED, INTENTIONAL, Statin not prescribed intentionally due to Intolerance 0 each 0     Probiotic Product (PROBIOTIC PO) Take 1  capsule by mouth daily as needed As needed       ondansetron (ZOFRAN ODT) 4 MG ODT tab Take 1-2 tablets (4-8 mg) by mouth 3 times daily (before meals) (Patient not taking: Reported on 2017) 20 tablet 1     losartan (COZAAR) 100 MG tablet Take 1 tablet (100 mg) by mouth daily (Patient not taking: Reported on 2017) 90 tablet 3     blood glucose monitoring (ACCU-CHEK FASTCLIX) lancets 1 each 2 times daily (Patient not taking: Reported on 2017) 1 Box 11      Past Medical History:   Diagnosis Date     Essential hypertension      Fibromyalgia      High cholesterol      History of blood transfusion      Intramural leiomyoma of uterus      Irritable bowel syndrome      Osteoarthrosis      Renal cell carcinoma     s/p partial right nephrectomy ([facility])     Sleep apnea     BI Pap     Family History   Problem Relation Age of Onset     C.A.D. Mother 74     MI, CABG     Hypertension Mother      Lipids Mother      Connective Tissue Disorder Mother      RA     OSTEOPOROSIS Mother      Gallbladder Disease Mother      Other - See Comments Mother      varicose veins      C.A.D. Father 49     MI, angioplasy     Cardiovascular Father 49     CEA     Neurologic Disorder Father      parkinsons     Lipids Father      Hypertension Father      CEREBROVASCULAR DISEASE Paternal Aunt       Social History     Social History     Marital status:      Spouse name: N/A     Number of children: N/A     Years of education: N/A     Occupational History     Not on file.     Social History Main Topics     Smoking status: Never Smoker     Smokeless tobacco: Never Used     Alcohol use No     Drug use: No     Sexual activity: Not Currently     Other Topics Concern     Parent/Sibling W/ Cabg, Mi Or Angioplasty Before 65f 55m? No     Social History Narrative    .  .   Retired nurse     Past Surgical History:   Procedure Laterality Date     BIOPSY BREAST SEED LOCALIZATION  2013    Procedure: BIOPSY BREAST SEED  "LOCALIZATION;  BIOPSY BREAST SEED LOCALIZATION - LEFT  ;  Surgeon: Martha Hodges MD;  Location: [facility] OR     HC DILATION/CURETTAGE DIAG/THER NON OB  2000    showed endometrial polyps     KNEE SURGERY      left      KNEE SURGERY      right      Miscarriage - D&C  1963     NEPHRECTOMY PARTIAL  8/2012    right       EXAM:  /68 (BP Location: Right arm, Patient Position: Chair, Cuff Size: Adult Regular)  Temp 98.4  F (36.9  C) (Oral)  Ht 5' 6\" (1.676 m)  Wt 202 lb (91.6 kg)  BMI 32.6 kg/m2  Abdomen: soft, nontender, without hepatosplenomegaly or masses  : normal cervix, adnexae, and uterus without masses or discharge      ASSESSMENT/PLAN:  (R93.8) Thickened endometrium  (primary encounter diagnosis)  Plan: Surgical pathology exam                  Procedure:        cervix visualized with speculum          -painted with Betadine          grasped with tenaculum          sounded to 9 cm          moderate amount of tissue obtained and sent to pathology  All instruments removed from cervix, uterus and vagina      Follow up appointment in two weeks.  "

## 2017-05-19 NOTE — MR AVS SNAPSHOT
After Visit Summary   5/19/2017    Rosemarie Dolan    MRN: [id number]           Patient Information     Date Of Birth          1945        Visit Information        Provider Department      5/19/2017 10:15 AM Miguel A Talbot MD [facility]        Today's Diagnoses     Thickened endometrium    -  1       Follow-ups after your visit        Your next 10 appointments already scheduled     Nov 30, 2017 10:00 AM CST   Return Sleep Patient with Aldo Longoria MD   [facility] ([facility])    [address] [place] [id number] [phone]              Who to contact     If you have questions or need follow up information about today's clinic visit or your schedule please contact [facility] directly at [phone].  Normal or non-critical lab and imaging results will be communicated to you by MyChart, letter or phone within 4 business days after the clinic has received the results. If you do not hear from us within 7 days, please contact the clinic through MyChart or phone. If you have a critical or abnormal lab result, we will notify you by phone as soon as possible.  Submit refill requests through [employer] or call your pharmacy and they will forward the refill request to us. Please allow 3 business days for your refill to be completed.          Additional Information About Your Visit        MyChart Information     [employer] gives you secure access to your electronic health record. If you see a primary care provider, you can also send messages to your care team and make appointments. If you have questions, please call your primary care clinic.  If you do not have a primary care provider, please call [phone] and they will assist you.        Care EveryWhere ID     This is your Care EveryWhere ID. This could be used by other organizations to access your [place] medical records  [id number]        Your  "Vitals Were     Temperature Height BMI (Body Mass Index)             98.4  F (36.9  C) (Oral) 5' 6\" (1.676 m) 32.6 kg/m2          Blood Pressure from Last 3 Encounters:   05/19/17 130/68   05/04/17 140/72   05/09/17 140/72    Weight from Last 3 Encounters:   05/19/17 202 lb (91.6 kg)   05/04/17 201 lb (91.2 kg)   05/09/17 201 lb (91.2 kg)              We Performed the Following     ENDOMETRIAL BIOPSY W/O CERVICAL DILATION     Surgical pathology exam        Primary Care Provider Office Phone # Fax #    Tegan Bowden [phone]       [address] [place] [address]        Thank you!     Thank you for choosing [facility]  for your care. Our goal is always to provide you with excellent care. Hearing back from our patients is one way we can continue to improve our services. Please take a few minutes to complete the written survey that you may receive in the mail after your visit with us. Thank you!             Your Updated Medication List - Protect others around you: Learn how to safely use, store and throw away your medicines at www.disposemymeds.org.          This list is accurate as of: 5/19/17 12:03 PM.  Always use your most recent med list.                   Brand Name Dispense Instructions for use    blood glucose monitoring lancets     1 Box    1 each 2 times daily       blood glucose monitoring test strip    ACCU-CHEK SMARTVIEW    1 Box    Test 1 time a day.       CALCIUM COMPLEX OR      Take by mouth daily 2 tablets daily       dicyclomine 10 MG capsule    BENTYL    120 capsule    Take 2 capsules (20 mg) by mouth 4 times daily as needed       PISANO SEAL PO      Take by mouth daily       losartan 100 MG tablet    COZAAR    90 tablet    Take 1 tablet (100 mg) by mouth daily       magnesium gluconate 500 (27 MG) MG tablet    MAGONATE     Take 1 tablet (500 mg) by mouth 2 times daily       MECLIZINE HCL PO      Take 12.5 mg by mouth 3 " times daily as needed for dizziness       NORVASC PO      Take 5 mg by mouth every evening       ondansetron 4 MG ODT tab    ZOFRAN ODT    20 tablet    Take 1-2 tablets (4-8 mg) by mouth 3 times daily (before meals)       oxyCODONE-acetaminophen 5-325 MG per tablet    PERCOCET    30 tablet    Take 1-2 tablets by mouth every 4 hours as needed for pain       PROBIOTIC PO      Take 1 capsule by mouth daily as needed As needed       rOPINIRole 1 MG tablet    REQUIP    270 tablet    Take 1 tablet (1 mg) by mouth 3 times daily       STATIN NOT PRESCRIBED (INTENTIONAL)     0 each    Statin not prescribed intentionally due to Intolerance       VITAMIN B-12 PO          vitamin D 2000 UNITS Caps     30 capsule    Take 2 tablets by mouth daily

## 2017-05-24 NOTE — TELEPHONE ENCOUNTER
"Pt. Calling to check on biposy results. She is overhead tearful and slightly SOB and her response is that she \"does not want to die\". Triage informed her of that biposy results are in process.     She continues to report inability to eat, feels weak, fatigued, also having continued abdominal pain, notices ascites abdomen seems more distended since the she  last seen Dr. Talbot. Reports abdominal pain seems worse, has been having night sweats but has not checked her temperature and trying to keep her room cool. Pt. Reported SOB with stair use, unsure if this is her norm. Pt. Reported he daughter is going to take her to [place] today.  Triage advised she may need to be seen in the ER at [facility] as her current sx sound concerning and may need to be addressed sooner than taking a drive down to [place]. Triage asked for her daughters # pt. Stated she did not know it but will call her.    Pt. Requested to call her on her cell: [phone] if the results are in.    Routing to Dr. Talbot for advisal. PCP is BEN.    Kathy Perea, RN, BSN, PHN    "

## 2017-08-06 NOTE — ED AVS SNAPSHOT
[facility]    [address]    [facility] [address]    Phone:  [phone]    Fax:  [phone]                                       Rosemarie Dolan   MRN: [id number]    Department:  [facility] Emergency Department   Date of Visit:  8/6/2017           After Visit Summary Signature Page     I have received my discharge instructions, and my questions have been answered. I have discussed any challenges I see with this plan with the nurse or doctor.    ..........................................................................................................................................  Patient/Patient Representative Signature      ..........................................................................................................................................  Patient Representative Print Name and Relationship to Patient    ..................................................               ................................................  Date                                            Time    ..........................................................................................................................................  Reviewed by Signature/Title    ...................................................              ..............................................  Date                                                            Time

## 2017-08-06 NOTE — ED AVS SNAPSHOT
[facility] Emergency Department    [address] [place] [address]    Phone:  [phone]    Fax:  [phone]                                       Rosemarie Dolan   MRN: [id number]    Department:  [facility] Emergency Department   Date of Visit:  8/6/2017           Patient Information     Date Of Birth          1945        Your diagnoses for this visit were:     Myalgia     Generalized muscle weakness     Status post chemotherapy     Nausea        You were seen by Briana Gee MD.      Follow-up Information     Follow up with Tegan Bowden MD.    Specialty:  Pediatrics    Why:  As needed    Contact information:    [address] [place] [id number] [phone]          Follow up with Your oncologist. Call in 1 day.      Discharge References/Attachments     MYALGIAS (ENGLISH)    CONTROLLING NAUSEA AND VOMITING, ONCOLOGY (ENGLISH)      Future Appointments        Provider Department Dept Phone Center    11/30/2017 10:00 AM Aldo Longoria MD, MD [facility] [phone] [facility]      24 Hour Appointment Hotline       To make an appointment at any [facility], call [phone] ([phone]). If you don't have a family doctor or clinic, we will help you find one. [facility] are conveniently located to serve the needs of you and your family.             Review of your medicines      Our records show that you are taking the medicines listed below. If these are incorrect, please call your family doctor or clinic.        Dose / Directions Last dose taken    blood glucose monitoring lancets   Dose:  1 each   Quantity:  1 Box        1 each 2 times daily   Refills:  11        blood glucose monitoring test strip   Commonly known as:  ACCU-CHEK SMARTVIEW   Quantity:  1 Box        Test 1 time a day.   Refills:  11        CALCIUM COMPLEX OR        Take by mouth daily 2 tablets daily   Refills:   0        dicyclomine 10 MG capsule   Commonly known as:  BENTYL   Dose:  20 mg   Quantity:  120 capsule        Take 2 capsules (20 mg) by mouth 4 times daily as needed   Refills:  1        PISANO SEAL PO        Take by mouth daily   Refills:  0        losartan 100 MG tablet   Commonly known as:  COZAAR   Dose:  100 mg   Quantity:  90 tablet        Take 1 tablet (100 mg) by mouth daily   Refills:  3        magnesium gluconate 500 (27 MG) MG tablet   Commonly known as:  MAGONATE   Dose:  500 mg        Take 1 tablet (500 mg) by mouth 2 times daily   Refills:  0        MECLIZINE HCL PO   Dose:  12.5 mg        Take 12.5 mg by mouth 3 times daily as needed for dizziness   Refills:  0        NORVASC PO   Dose:  5 mg        Take 5 mg by mouth every evening   Refills:  0        ondansetron 4 MG ODT tab   Commonly known as:  ZOFRAN ODT   Dose:  4-8 mg   Quantity:  20 tablet        Take 1-2 tablets (4-8 mg) by mouth 3 times daily (before meals)   Refills:  1        oxyCODONE-acetaminophen 5-325 MG per tablet   Commonly known as:  PERCOCET   Dose:  1-2 tablet   Quantity:  30 tablet        Take 1-2 tablets by mouth every 4 hours as needed for pain   Refills:  0        PROBIOTIC PO   Dose:  1 capsule        Take 1 capsule by mouth daily as needed As needed   Refills:  0        rOPINIRole 1 MG tablet   Commonly known as:  REQUIP   Dose:  1 mg   Quantity:  270 tablet        Take 1 tablet (1 mg) by mouth 3 times daily   Refills:  3        STATIN NOT PRESCRIBED (INTENTIONAL)   Quantity:  0 each        Statin not prescribed intentionally due to Intolerance   Refills:  0        VITAMIN B-12 PO        Refills:  0        vitamin D 2000 UNITS Caps   Dose:  2 tablet   Quantity:  30 capsule        Take 2 tablets by mouth daily   Refills:  0                Procedures and tests performed during your visit     Basic metabolic panel    CBC with platelets differential    CK total    CT Chest Pulmonary Embolism w Contrast    Chest XR,  PA & LAT     D dimer quantitative    Hepatic panel    Lactic acid    Lipase    Troponin I (now)    UA with Microscopic      Orders Needing Specimen Collection     None      Pending Results     No orders found from 8/4/2017 to 8/7/2017.            Pending Culture Results     No orders found from 8/4/2017 to 8/7/2017.            Pending Results Instructions     If you had any lab results that were not finalized at the time of your Discharge, you can call the ED Lab Result RN at [phone]. You will be contacted by this team for any positive Lab results or changes in treatment. The nurses are available 7 days a week from 10A to 6:30P.  You can leave a message 24 hours per day and they will return your call.        Test Results From Your Hospital Stay        8/6/2017 11:00 AM      Component Results     Component Value Ref Range & Units Status    WBC 3.3 (L) 4.0 - 11.0 10e9/L Final    RBC Count 3.04 (L) 3.8 - 5.2 10e12/L Final    Hemoglobin 9.0 (L) 11.7 - 15.7 g/dL Final    Hematocrit 27.4 (L) 35.0 - 47.0 % Final    MCV 90 78 - 100 fl Final    MCH 29.6 26.5 - 33.0 pg Final    MCHC 32.8 31.5 - 36.5 g/dL Final    RDW 24.7 (H) 10.0 - 15.0 % Final    Platelet Count 118 (L) 150 - 450 10e9/L Final    Diff Method Automated Method  Final    % Neutrophils 78.0 % Final    % Lymphocytes 15.3 % Final    % Monocytes 5.5 % Final    % Eosinophils 0.3 % Final    % Basophils 0.3 % Final    % Immature Granulocytes 0.6 % Final    Nucleated RBCs 0 0 /100 Final    Absolute Neutrophil 2.6 1.6 - 8.3 10e9/L Final    Absolute Lymphocytes 0.5 (L) 0.8 - 5.3 10e9/L Final    Absolute Monocytes 0.2 0.0 - 1.3 10e9/L Final    Absolute Eosinophils 0.0 0.0 - 0.7 10e9/L Final    Absolute Basophils 0.0 0.0 - 0.2 10e9/L Final    Abs Immature Granulocytes 0.0 0 - 0.4 10e9/L Final    Absolute Nucleated RBC 0.0  Final         8/6/2017 11:22 AM      Component Results     Component Value Ref Range & Units Status    Sodium 137 133 - 144 mmol/L Final    Potassium 3.6 3.4  - 5.3 mmol/L Final    Chloride 103 94 - 109 mmol/L Final    Carbon Dioxide 25 20 - 32 mmol/L Final    Anion Gap 9 3 - 14 mmol/L Final    Glucose 100 (H) 70 - 99 mg/dL Final    Urea Nitrogen 10 7 - 30 mg/dL Final    Creatinine 0.64 0.52 - 1.04 mg/dL Final    GFR Estimate >90  Non  GFR Calc   >60 mL/min/1.7m2 Final    GFR Estimate If Black >90   GFR Calc   >60 mL/min/1.7m2 Final    Calcium 8.1 (L) 8.5 - 10.1 mg/dL Final         8/6/2017 11:22 AM      Component Results     Component Value Ref Range & Units Status    Bilirubin Direct 0.2 0.0 - 0.2 mg/dL Final    Bilirubin Total 1.0 0.2 - 1.3 mg/dL Final    Albumin 2.8 (L) 3.4 - 5.0 g/dL Final    Protein Total 6.2 (L) 6.8 - 8.8 g/dL Final    Alkaline Phosphatase 67 40 - 150 U/L Final    ALT 15 0 - 50 U/L Final    AST 16 0 - 45 U/L Final         8/6/2017 11:22 AM      Component Results     Component Value Ref Range & Units Status    Lipase 76 73 - 393 U/L Final         8/6/2017 11:22 AM      Component Results     Component Value Ref Range & Units Status    Troponin I ES  0.000 - 0.045 ug/L Final    <0.015  The 99th percentile for upper reference range is 0.045 ug/L.  Troponin values in   the range of 0.045 - 0.120 ug/L may be associated with risks of adverse   clinical events.           8/6/2017 11:28 AM      Component Results     Component Value Ref Range & Units Status    D Dimer 6.0 (H) 0.0 - 0.50 ug/ml FEU Final    This D-dimer assay is intended for use in conjunction with a clinical pretest   probability assessment model to exclude pulmonary embolism (PE) and deep   venous   thrombosis (DVT) in outpatients suspected of PE or DVT. The cut-off value is   0.5 ug/mL FEU.           8/6/2017 11:22 AM      Component Results     Component Value Ref Range & Units Status    CK Total 36 30 - 225 U/L Final         8/6/2017  1:38 PM      Narrative     CHEST TWO VIEWS   8/6/2017 11:15 AM     HISTORY: Shortness of breath, chest  tightness.    COMPARISON: 5/4/2014.        Impression     IMPRESSION: No acute cardiopulmonary disease.    TONY YANCEY MD         8/6/2017  2:58 PM      Component Results     Component Value Ref Range & Units Status    Color Urine Yellow  Final    Appearance Urine Clear  Final    Glucose Urine Negative NEG mg/dL Final    Bilirubin Urine Negative NEG Final    Ketones Urine 5 (A) NEG mg/dL Final    Specific Gravity Urine 1.005 1.003 - 1.035 Final    Blood Urine Negative NEG Final    pH Urine 7.0 5.0 - 7.0 pH Final    Protein Albumin Urine Negative NEG mg/dL Final    Urobilinogen mg/dL 0.0 0.0 - 2.0 mg/dL Final    Nitrite Urine Negative NEG Final    Leukocyte Esterase Urine Negative NEG Final    Source Midstream Urine  Final    WBC Urine <1 0 - 2 /HPF Final    RBC Urine <1 0 - 2 /HPF Final    Squamous Epithelial /HPF Urine <1 0 - 1 /HPF Final         8/6/2017 10:57 AM      Component Results     Component Value Ref Range & Units Status    Lactic Acid 0.9 0.4 - 2.0 mmol/L Final               8/6/2017  1:38 PM      Narrative     CT CHEST PULMONARY EMBOLISM WITH CONTRAST  8/6/2017 12:29 PM    HISTORY:  Malignancy, shortness of breath, elevated D-dimer.    TECHNIQUE:  Scans obtained from the apices through the diaphragm with  IV contrast. 73 mL Isovue-370 injected. Radiation dose for this scan  was reduced using automated exposure control, adjustment of the mA  and/or kV according to patient size, or iterative reconstruction  technique.    COMPARISON:  CT abdomen and pelvis 5/17/2017.    FINDINGS:  No acute thoracic aortic abnormality. Thoracic aortic mild  calcifications. No evidence for pulmonary embolism. No effusions.  There are a few scattered small mediastinal and axillary lymph nodes.  No convincing enlarged lymph nodes. Mild bibasilar atelectasis. No  acute airspace disease. Upper abdomen images again shows small  ascites. No aggressive chest bone lesions identified.        Impression     IMPRESSION:   1. No  pulmonary embolism, acute thoracic aortic abnormality, or acute  airspace disease.  2. Small upper abdominal ascites.    TONY YANCEY MD                Clinical Quality Measure: Blood Pressure Screening     Your blood pressure was checked while you were in the emergency department today. The last reading we obtained was  BP: 135/71 . Please read the guidelines below about what these numbers mean and what you should do about them.  If your systolic blood pressure (the top number) is less than 120 and your diastolic blood pressure (the bottom number) is less than 80, then your blood pressure is normal. There is nothing more that you need to do about it.  If your systolic blood pressure (the top number) is 120-139 or your diastolic blood pressure (the bottom number) is 80-89, your blood pressure may be higher than it should be. You should have your blood pressure rechecked within a year by a primary care provider.  If your systolic blood pressure (the top number) is 140 or greater or your diastolic blood pressure (the bottom number) is 90 or greater, you may have high blood pressure. High blood pressure is treatable, but if left untreated over time it can put you at risk for heart attack, stroke, or kidney failure. You should have your blood pressure rechecked by a primary care provider within the next 4 weeks.  If your provider in the emergency department today gave you specific instructions to follow-up with your doctor or provider even sooner than that, you should follow that instruction and not wait for up to 4 weeks for your follow-up visit.        Thank you for choosing [facility]       Thank you for choosing [facility] for your care. Our goal is always to provide you with excellent care. Hearing back from our patients is one way we can continue to improve our services. Please take a few minutes to complete the written survey that you may receive in the mail after you visit with us. Thank you!        [facility]  Information     [email or web address] gives you secure access to your electronic health record. If you see a primary care provider, you can also send messages to your care team and make appointments. If you have questions, please call your primary care clinic.  If you do not have a primary care provider, please call [phone] and they will assist you.        Care EveryWhere ID     This is your Care EveryWhere ID. This could be used by other organizations to access your [facility] records  [id number]        Equal Access to Services     JANE HAYES : Hadii starr kruegero Sojaimeali, waaxda luqadaha, qaybta kaalmada adeadela, ezekiel soliz. So [facility] [phone].    ATENCIÓN: Si habla español, tiene a selby disposición servicios gratuitos de asistencia lingüística. Llame al [phone].    We comply with applicable federal civil rights laws and [place] laws. We do not discriminate on the basis of race, color, national origin, age, disability sex, sexual orientation or gender identity.            After Visit Summary       This is your record. Keep this with you and show to your community pharmacist(s) and doctor(s) at your next visit.

## 2017-08-06 NOTE — ED PROVIDER NOTES
"  History     Chief Complaint:  Generalized Body Aches, Generalized Weakness, Nausea    The history is provided by the patient.      Rosemarie Dolan is a 72 year old female with  who presents to the emergency department today for evaluation of generalized body aches, generalized weakness, and nausea. The patient has  endometrial cancer with metastasis and recently missed her weekly chemotherapy 3 weeks in a row due to low blood counts. On Thursday, she resumed her Taxol and Carboplatin chemotherapy. She felt tired that same day as she was given Benadryl, but was otherwise well. The following day (2 days ago), the patient began having generalized myalgias, arthralgias, burning stomach pain, \"heartburn,\" chest pain, nausea. She has been unable to tolerate fluids and also reports shortness of breath as her chest hurts when breathing, increased urinary frequency, and subjective fever. She denies dysuria, hematuria, cough, sore throat, headache, vision changes, leg swelling, or history of chemo reactions in the past. Of note, the patient has increased her pain medications (oxycodone and OxyContin) and Zofran over the past few days; she has not had a bowel movement in two days and is concerned for constipation. She last took oxycodone this morning.    Allergies:  Gluten  Lyrica  Naproxen  Penicillins  Sulfa Drugs     Medications:    Carboplatin  Taxol  Colace  Vitamin B12  Dexamethasone Injection  Benadryl  Pepcid  Granisetron  Norvasc  Zofran  Oxycodone  OxyContin  Miralax  Compazine  Ropinirole  Senokot  Vitamin D3    Past Medical History:    Endometrial cancer  Carcinomatosis  Essential hypertension   Fibromyalgia   High cholesterol   History of blood transfusion   Intramural leiomyoma of uterus   Irritable bowel syndrome   Osteoarthrosis   Renal cell carcinoma   Sleep apnea    Past Surgical History:    Biopsy breast seed localization  Dilation and curettage - endometrial polyps  Bilateral knee " surgery  Miscarriage  Right partial nephrectomy    Family History:    CAD - mother, father  Cardiovascular - father  Hypertension - mother, father  Lipids - mother, father  Rheumatoid arthritis - mother  Osteoporosis - mother  Gallbladder disease - mother  Parkinson's disease - father    Social History:  The patient was accompanied to the ED by her hsuband.  Smoking Status: Never smoker  Smokeless Tobacco: Never used  Alcohol Use: No  Marital Status:   [2]     Review of Systems   Constitutional: Positive for fatigue and fever (subjective).   HENT: Negative for sore throat.    Eyes: Negative for visual disturbance.   Respiratory: Negative for cough.    Cardiovascular: Positive for chest pain. Negative for leg swelling.   Gastrointestinal: Positive for abdominal pain, constipation and nausea. Negative for vomiting.   Genitourinary: Positive for frequency. Negative for dysuria and hematuria.   Musculoskeletal: Positive for arthralgias and myalgias.   Neurological: Negative for headaches.   All other systems reviewed and are negative.    Physical Exam   Vitals:  Patient Vitals for the past 24 hrs:   BP Temp Temp src Pulse Heart Rate Resp SpO2   08/06/17 1551 - 98.6  F (37  C) Oral - - - -   08/06/17 1515 - 98.7  F (37.1  C) Oral - - - -   08/06/17 1514 - 98.2  F (36.8  C) Temporal - - - -   08/06/17 1345 135/71 - - - 71 - 91 %   08/06/17 1330 134/67 - - - 73 - 92 %   08/06/17 1315 140/77 - - - 69 - 91 %   08/06/17 1300 150/76 - - - 77 - 95 %   08/06/17 1245 160/75 - - - 83 - 99 %   08/06/17 1236 135/68 - - - - - -   08/06/17 1200 - - - - - - 94 %   08/06/17 1145 - - - - - - 93 %   08/06/17 1130 - - - - - - 94 %   08/06/17 1103 125/60 - - - - - 96 %   08/06/17 1100 - - - - - - 93 %   08/06/17 1045 - - - - - - 98 %   08/06/17 1030 - - - - - - 95 %   08/06/17 1015 - - - - - - 99 %   08/06/17 1004 141/74 99.5  F (37.5  C) Oral 82 82 24 97 %   08/06/17 1000 - - - - - - 98 %     Physical Exam  General: WD/WN; fatigued  appearing elderly  female; cooperative  Head:  Atraumatic, alopecia  Eyes:  EOMI; conjunctivae, lids, and sclerae are normal  ENT:    Normal nose; MMM  Neck:  Supple; normal ROM  CV:  RRR; normal heart sounds with no m/r/g detected  Resp:  No respiratory distress; CTAB without decreased breath sounds, wheezing, rales, or rhonchi  GI:  Soft; ND, minimal epigastric tenderness   MS:  Normal ROM; no BLE edema; generalized muscular tenderness including bilateral calves  Skin:  Warm; non-diaphoretic; no pallor  Neuro: Awake; A&Ox3; normal strength  Psych:  Normal mood and affect; normal speech  Vitals reviewed.    Emergency Department Course     Imaging:  Radiology findings were communicated with the patient and family who voiced understanding of the findings.    Chest XR, PA & LAT  No acute cardiopulmonary disease.  Reading per radiology    CT Chest Pulmonary Embolism w contrast  1. No pulmonary embolism, acute thoracic aortic abnormality, or acute  airspace disease.  2. Small upper abdominal ascites.  Reading per radiology    Laboratory:  Laboratory findings were communicated with the patient and family who voiced understanding of the findings.    CBC: WBC 3.3 (L), HBG 9.0 (L),  (L)   BMP: Glucose 100 (H), Calcium 8.1 (L) o/w WNL (Creatinine 0.64)  Hepatic panel: Albumin 2.8 (L), Protein total 6.2 (L) o/w WNL  Lipase: 76  Troponin (Collected 1040): <0.015  D Dimer (Collected 1040): 6.0 (H)  CK total: 36  Lactic Acid (Collected at 1040): 0.9    UA with micro: Urine Ketone 5 (A) o/w negative    Interventions:  1103 NS 1L IV  1105 Dilaudid 0.5 mg IV  1105 Pepcid 20 mg IV  1106 Zofran 4 mg IV  1254 Ropinirole 1 mg Oral  1540 Dilaudid 0.5 mg IV     Emergency Department Course:  Nursing notes and vitals reviewed.  I performed an exam of the patient as documented above.   IV was inserted and blood was drawn for laboratory testing, results above.  The patient provided a urine sample here in the emergency  "department. This was sent for laboratory testing, findings above.  The patient was sent for a Chest XR, PA & LAT, and CT chest pulmonary embolism w contrast while in the emergency department, results above.   At 1155 the patient was rechecked and was updated on the results of laboratory studies and D dimer. She consented to CTA.  At 1413 the patient was rechecked and was updated on the results of laboratory and imaging studies. She was feeling better after IV fluids and medications, but pain was starting to come back.  I discussed the treatment plan with the patient and family. They expressed understanding of this plan and consented to discharge. She will be discharged home with instructions for care and follow up. In addition, the patient will return to the emergency department if their symptoms persist, worsen, if new symptoms arise or if there is any concern.  All questions were answered.  I personally reviewed the laboratory and imaging results with the Patient and family and answered all related questions prior to discharge.    Impression & Plan      Medical Decision Making:  Rosemarie Dolan is a 72 year old female with endometrial cancer who underwent chemotherapy four days ago and presents with generalized arthralgias, myalgia, nausea, generalized weakness, general fatigue. Patient does appear fatigued but is otherwise well appearing. Her vitals are reassuring. She was given Dilaudid and Zofran for her symptoms. She was also given Pepcid for \"burning\" epigastric pain. She requested and was given her home dose of Requip as well. During her workup, she was given IV fluids. BMP, LFTs, lactic acid, lipase were all reassuring. Her troponin was negative. CBC reveals a mild leukopenia that appears to be approximately the same as her recent labs performed on August 2nd as an outpatient. She has a normocytic anemia that is at her baseline. She also has a thrombocytopenia that is similar to recent labs. Patient's D " dimer is elevated at 6.0. Given patient's chest pain with perceived difficulty taking a deep breath as well as her malignancy, I am concerned for pulmonary embolism. I discussed this with the patient who verbalized understanding and is agreeable to CT angiogram. CT angiogram was performed without evidence of pulmonary embolus. Of note, her chest xray was similarly reassuring. Patient's urinalysis was without evidence of infection.    I discussed all results with the patient and answered all her questions. It is likely that her symptoms are due to her recent chemotherapy. Patient states she is feeling better after fluids and medications, though her pain is starting to return so she will be given a second dose of Dilaudid prior to leaving the emergency department. It is also possible the patient's symptoms are due to a viral syndrome complicating her post chemotherapy state. However, she is afebrile here and her workup is reassuring. Patient is amenable to discharge. She agrees to call her oncologist tomorrow to discuss her visit and further treatment. Patient verbalizes understanding of her return precautions. All of her and her 's questions were answered and they are amenable to discharge at this time.    Diagnosis:    ICD-10-CM    1. Myalgia M79.1    2. Generalized muscle weakness M62.81    3. Status post chemotherapy Z92.21    4. Nausea R11.0      Disposition:   Home    Scribe Disclosure:  IPily, am serving as a scribe at 10:14 AM on 8/6/2017 to document services personally performed by Briana Gee MD, based on my observations and the provider's statements to me.    8/6/2017   [facility] EMERGENCY DEPARTMENT       Briana Gee MD  08/06/17 0711

## 2017-08-06 NOTE — ED NOTES
Patient with endometrial cancer with metastasis. She is in palliative care. She had missed her weekly chemotherapy 3 weeks in a row due to low blood counts but finally had it again on Thursday. She started feeling really awful the next day with body aches, joint pain, abdominal pain, chest pain and nausea. Unable to tolerate fluids due to nausea and abdominal pain; she feels she is getting dehydrated. She has increased her pain medications over the past few days, as well as her zofran; she has not had a BM in a couple days and is concerned she is also constipated.    She has her most recent lab results from [facility] with her ((8/2/2017)

## 2019-09-26 NOTE — TELEPHONE ENCOUNTER
Left message with central scheduling number and our number (if questions) for pt to schedule imaging. Script sent.  Tegan Bowden MD

## 2021-05-29 ENCOUNTER — RECORDS - HEALTHEAST (OUTPATIENT)
Dept: ADMINISTRATIVE | Facility: CLINIC | Age: 76
End: 2021-05-29

## 2022-08-19 NOTE — ED AVS SNAPSHOT
[facility] Emergency Department    [address]    [facility] [address]    Phone:  [phone]    Fax:  [phone]                                       Rosemarie Dolan   MRN: [id number]    Department:  [facility] Emergency Department   Date of Visit:  3/26/2017           After Visit Summary Signature Page     I have received my discharge instructions, and my questions have been answered. I have discussed any challenges I see with this plan with the nurse or doctor.    ..........................................................................................................................................  Patient/Patient Representative Signature      ..........................................................................................................................................  Patient Representative Print Name and Relationship to Patient    ..................................................               ................................................  Date                                            Time    ..........................................................................................................................................  Reviewed by Signature/Title    ...................................................              ..............................................  Date                                                            Time           "Malcom Roblerojaki Zavaleta was seen and treated in our emergency department on 8/19/2022.  She may return to work on 08/22/2022.       If you have any questions or concerns, please don't hesitate to call.      Gaurang Elder PA-C"